# Patient Record
Sex: MALE | Race: WHITE | NOT HISPANIC OR LATINO | Employment: FULL TIME | URBAN - METROPOLITAN AREA
[De-identification: names, ages, dates, MRNs, and addresses within clinical notes are randomized per-mention and may not be internally consistent; named-entity substitution may affect disease eponyms.]

---

## 2017-01-04 ENCOUNTER — OFFICE VISIT (OUTPATIENT)
Dept: OTOLARYNGOLOGY | Facility: CLINIC | Age: 22
End: 2017-01-04

## 2017-01-04 VITALS — HEIGHT: 76 IN | BODY MASS INDEX: 25.57 KG/M2 | WEIGHT: 210 LBS

## 2017-01-04 DIAGNOSIS — J32.4 CHRONIC PANSINUSITIS: Primary | ICD-10-CM

## 2017-01-04 ASSESSMENT — PAIN SCALES - GENERAL: PAINLEVEL: NO PAIN (0)

## 2017-01-04 NOTE — NURSING NOTE
Chief Complaint   Patient presents with     RECHECK     Return Rhinology - Chronic pansinusitis      Pt states no pain today.    N Ba KIMBROUGH

## 2017-01-04 NOTE — PATIENT INSTRUCTIONS
Plan of care:  You are scheduled for a polypectomy on 1/18 at 1130  Clinic contact information:  1. To schedule an appointment call [phone], option 1  2. To talk to the Triage RN call [phone], option 3  3. If you need to speak to Mignon or get a message to your doctor on a Friday, call the triage RN  4. MignonRN: [phone]  5. Surgery scheduling:      Jessika Khan: [phone]      Janie Rodriguez: [phone]  6. Fax: [phone]

## 2017-01-04 NOTE — Clinical Note
1/4/2017       RE: Manuel Gasca  [address] [facility] [address]     Dear Colleague,    Thank you for referring your patient, Manuel Gasca, to the [facility] EAR NOSE AND THROAT at [facility]. Please see a copy of my visit note below.    HISTORY OF PRESENT ILLNESS:  Manuel Gasca returns.  He has chronic rhinosinusitis related to cystic fibrosis.  He has had multiple procedures and has recurrent obstruction of his right frontal sinus.  He called in recently and we put him on some prednisone and Levaquin.  He responded nicely to that.  He comes in today for a routine check.  He also thinks that the irrigations he is doing with gentamicin are helping clear out some of the crusts in his nose.      PHYSICAL EXAMINATION:  He is examined today.      PROCEDURE:  Topical anesthetic decongestant solution is applied, and nasal endoscopy is performed.  On the left, he has some polypoid degeneration in the anterior aspect of the middle meatus.  Minimal crusting.  On the right, he also has minimal crusting but he has a large polyp in the anterior aspect of the middle meatus similar to what I have seen in the past on him.  He has a recurrent polyp in this area.      ASSESSMENT AND PLAN:  I recommended he come in for a polyp shaving procedure here in the clinic sometime in the near future.  Right now, he is not having any evidence of complete obstruction.  His eye is in a normal position, and he is not having pain so I will not treat him right now with prednisone or Levaquin.  We will bring him back in for the polyp shaving procedure sometime in the near future.     Again, thank you for allowing me to participate in the care of your patient.      Sincerely,    Fay Young MD

## 2017-01-18 ENCOUNTER — OFFICE VISIT (OUTPATIENT)
Dept: OTOLARYNGOLOGY | Facility: CLINIC | Age: 22
End: 2017-01-18

## 2017-01-18 VITALS — HEIGHT: 77 IN | BODY MASS INDEX: 24.56 KG/M2 | WEIGHT: 208 LBS

## 2017-01-18 DIAGNOSIS — J32.4 CHRONIC PANSINUSITIS: Primary | ICD-10-CM

## 2017-01-18 RX ORDER — PREDNISONE 20 MG/1
TABLET ORAL
Qty: 6 TABLET | Refills: 0 | Status: SHIPPED | OUTPATIENT
Start: 2017-01-18 | End: 2017-08-11

## 2017-01-18 RX ORDER — LEVOFLOXACIN 500 MG/1
500 TABLET, FILM COATED ORAL DAILY
Qty: 14 TABLET | Refills: 0 | Status: SHIPPED | OUTPATIENT
Start: 2017-01-18 | End: 2017-02-01

## 2017-01-18 ASSESSMENT — PAIN SCALES - GENERAL: PAINLEVEL: NO PAIN (0)

## 2017-01-18 NOTE — PATIENT INSTRUCTIONS
Plan of care:  Today you had a polypectomy on the right side of your nose, frontal sinus  You might experience some bloody drainage for 24 hours  Levaquin 500mg/day for 14 days  Prednisone 40mg/day for 3 day  Clinic contact information:  1. To schedule an appointment call [phone], option 1  2. To talk to the Triage RN call [phone], option 3  3. If you need to speak to Mignon or get a message to your doctor on a Friday, call the triage RN  4. MignonRN: [phone]  5. Surgery scheduling:      Jessika Khan: [phone]      Janie Rodriguez: [phone]  6. Fax: [phone]

## 2017-01-18 NOTE — NURSING NOTE
"[facility] EAR NOSE AND THROAT  [address] [address]  Dept: [phone]  ______________________________________________________________________________    Patient: Manuel Gasca   : 1995   MRN: [id number]   2017    INVASIVE PROCEDURE SAFETY CHECKLIST    Date: 2017   Procedure:Polypectomy  Patient Name: Manuel Gasca  MRN: [id number]  YOB: 1995    Action: Complete sections as appropriate. Any discrepancy results in a HARD COPY until resolved.     PRE PROCEDURE:  Patient ID verified with 2 identifiers (name and  or MRN): Yes  Procedure and site verified with patient/designee (when able): Yes  Accurate consent documentation in medical record: Yes  H&P (or appropriate assessment) documented in medical record: NA  H&P must be up to 20 days prior to procedure and updates within 24 hours of procedure as applicable: {NA YES NO, EXPLAIN:692848::\"no  Relevant diagnostic and radiology test results appropriately labeled and displayed as applicable: no  Procedure site(s) marked with provider initials: {NA YES NO, EXPLAIN:693909:no    TIMEOUT:  Time-Out performed immediately prior to starting procedure, including verbal and active participation of all team members addressing the following:Yes  * Correct patient identify  * Confirmed that the correct side and site are marked  * An accurate procedure consent form  * Agreement on the procedure to be done  * Correct patient position  * Relevant images and results are properly labeled and appropriately displayed  * The need to administer antibiotics or fluids for irrigation purposes during the procedure as applicable   * Safety precautions based on patient history or medication use    DURING PROCEDURE: Verification of correct person, site, and procedures any time the responsibility for care of the patient is transferred to another member of the care team.               "

## 2017-01-18 NOTE — Clinical Note
1/18/2017       RE: Manuel Gasca  [address] [place] [id number]     Dear Colleague,    Thank you for referring your patient, Manuel Gasca, to the [facility] EAR NOSE AND THROAT at [facility]. Please see a copy of my visit note below.    HISTORY OF PRESENT ILLNESS:  Manuel Gasca presents for nasal polypectomy.  I saw him previously.  He has chronic sinusitis related to cystic fibrosis.  The last time I saw him, his frontal recess on the right was blocked with a large polyp.      PROCEDURE NOTE:  The risks and benefits were explained to the patient.  Signed consent was obtained.  He is in the procedure room.  Nose is anesthetized topically with lidocaine and phenylephrine solution.  A 0-degree rigid endoscope was used for visualization.  Injection with 1% lidocaine with epinephrine was performed into the polyp.  The polyp shaver is used to evacuate the polyp from the frontal recess.  I then passed a curved olive tip suction up into the frontal sinus and evacuated mucopurulent secretions.  He tolerates this well.  There still appears to be a moderate amount of inflamed granulation tissue around the opening.      ASSESSMENT AND PLAN:  I am going to treat him with Levaquin and prednisone and see him back in the next month or so.  I suspect the next time we address his issues, we will probably have to go to the operating room to clear out polyps and granulation tissue from the frontal recess on the right.  He seems comfortable with this plan.     Again, thank you for allowing me to participate in the care of your patient.      Sincerely,    Fay Young MD

## 2017-01-18 NOTE — PROGRESS NOTES
HISTORY OF PRESENT ILLNESS:  Manuel Gasca presents for nasal polypectomy.  I saw him previously.  He has chronic sinusitis related to cystic fibrosis.  The last time I saw him, his frontal recess on the right was blocked with a large polyp.      PROCEDURE NOTE:  The risks and benefits were explained to the patient.  Signed consent was obtained.  He is in the procedure room.  Nose is anesthetized topically with lidocaine and phenylephrine solution.  A 0-degree rigid endoscope was used for visualization.  Injection with 1% lidocaine with epinephrine was performed into the polyp.  The polyp shaver is used to evacuate the polyp from the frontal recess.  I then passed a curved olive tip suction up into the frontal sinus and evacuated mucopurulent secretions.  He tolerates this well.  There still appears to be a moderate amount of inflamed granulation tissue around the opening.      ASSESSMENT AND PLAN:  I am going to treat him with Levaquin and prednisone and see him back in the next month or so.  I suspect the next time we address his issues, we will probably have to go to the operating room to clear out polyps and granulation tissue from the frontal recess on the right.  He seems comfortable with this plan.

## 2017-03-06 ENCOUNTER — OFFICE VISIT (OUTPATIENT)
Dept: OTOLARYNGOLOGY | Facility: CLINIC | Age: 22
End: 2017-03-06

## 2017-03-06 DIAGNOSIS — J32.4 CHRONIC PANSINUSITIS: Primary | ICD-10-CM

## 2017-03-06 DIAGNOSIS — E84.0 CYSTIC FIBROSIS WITH PULMONARY MANIFESTATIONS (H): ICD-10-CM

## 2017-03-06 RX ORDER — LEVOFLOXACIN 500 MG/1
500 TABLET, FILM COATED ORAL DAILY
Qty: 14 TABLET | Refills: 0 | Status: SHIPPED | OUTPATIENT
Start: 2017-03-06 | End: 2017-03-20

## 2017-03-06 RX ORDER — AZITHROMYCIN 250 MG/1
TABLET, FILM COATED ORAL
Qty: 30 TABLET | Refills: 3 | Status: SHIPPED | OUTPATIENT
Start: 2017-03-06 | End: 2017-03-11

## 2017-03-06 RX ORDER — PREDNISONE 20 MG/1
TABLET ORAL
Qty: 15 TABLET | Refills: 1 | Status: SHIPPED | OUTPATIENT
Start: 2017-03-06 | End: 2017-08-11

## 2017-03-06 ASSESSMENT — PAIN SCALES - GENERAL: PAINLEVEL: MILD PAIN (3)

## 2017-03-06 NOTE — PROGRESS NOTES
HISTORY OF PRESENT ILLNESS:  The patient has chronic rhinosinusitis related to cystic fibrosis.  I have performed multiple surgeries on him.  He has fairly minimal pulmonary symptoms.  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  He had surgery last in October of last year.  He now comes in with a second bout of increased congestion and some head pressure and pain.  He has headaches consistently daily from 1 p.m. until 6 p.m.  He takes ibuprofen for this.  He has had increased coughing and congestion.  He has been doing gentamicin irrigations.  The last time I cultured his nasal secretions, he had Staph aureus and had become resistant to doxycycline and azithromycin.      PHYSICAL EXAMINATION:  He is examined today.  On anterior rhinoscopy, he has copious purulence.      PROCEDURE:  Topical anesthetic decongestant solution is applied, and nasal endoscopy is performed.  I suctioned secretions from the middle meatus and frontal recess bilaterally.  On the right, it is difficult to see.  He has large obstructing polyps and deviated septum.      ASSESSMENT AND PLAN:  He has fairly significant recurrence of his sinus disease.  He is traveling next week.  I would like to put him on prednisone again.  I gave him a prescription for Levaquin to take in the event that he develops increasing pain or pressure around his eye.  I am also going to restart him on azithromycin for the anti-inflammatory effect, not for the antibiotic effect.  He is comfortable with this plan.  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  I think at that time it would be good timing to go in and clean out the sinuses again.  I may need to extend the dissection to include partial resection of the septum in order to improve my ability to visualize his sinus on that side.  His mother was with him today, and we discussed this as well.

## 2017-03-06 NOTE — PATIENT INSTRUCTIONS
Plan of care:  Prednisone 40mg/day x 5 days then 20mg/day for 5 days  Levaquin 500mg/day x 14 days  Azithromycin 250mg/day for 30 days  Follow up to discuss surgery when finished with school  Clinic contact information:  1. To schedule an appointment call [phone], option 1  2. To talk to the Triage RN call [phone], option 3  3. If you need to speak to Mignon or get a message to your doctor on a Friday, call the triage RN  4. MignonRN: [phone]  5. Surgery scheduling:      Jessika Khan: [phone]      Janie Rodriguez: [phone]  6. Fax: [phone]  7. [phone]

## 2017-03-06 NOTE — MR AVS SNAPSHOT
After Visit Summary   3/6/2017    Manuel Gasca    MRN: [id number]           Patient Information     Date Of Birth          1995        Visit Information        Provider Department      3/6/2017 2:00 PM Fay Young MD [facility] Ear Nose and Throat        Today's Diagnoses     Chronic pansinusitis    -  1    Cystic fibrosis with pulmonary manifestations (H)          Care Instructions    Plan of care:  Prednisone 40mg/day x 5 days then 20mg/day for 5 days  Levaquin 500mg/day x 14 days  Azithromycin 250mg/day for 30 days  Follow up to discuss surgery when finished with school  Clinic contact information:  1. To schedule an appointment call [phone], option 1  2. To talk to the Triage RN call [phone], option 3  3. If you need to speak to Mignon or get a message to your doctor on a Friday, call the triage RN  4. MignonRN: [phone]  5. Surgery scheduling:      Jessika Khan: [phone]      Janie Rodriguez: [phone]  6. Fax: [phone]  7. [phone]          Follow-ups after your visit        Who to contact     Please call your clinic at [phone] to:    Ask questions about your health    Make or cancel appointments    Discuss your medicines    Learn about your test results    Speak to your doctor   If you have compliments or concerns about an experience at your clinic, or if you wish to file a complaint, please contact [facility] Patient Relations at [phone] or email us at [email or web address]         Additional Information About Your Visit        MyChart Information     [employer] is an electronic gateway that provides easy, online access to your medical records. With [employer], you can request a clinic appointment, read your test results, renew a prescription or communicate with your care team.     To sign up for [employer] visit the website at [email or web address]   You will be asked to enter the access code listed  below, as well as some personal information. Please follow the directions to create your username and password.     Your access code is: [email or web address]  Expires: 3/21/2017  6:30 AM     Your access code will  in 90 days. If you need help or a new code, please contact your [facility] or call [phone] for assistance.        Care EveryWhere ID     This is your Care EveryWhere ID. This could be used by other organizations to access your [facility] medical records  [id number]         Blood Pressure from Last 3 Encounters:   10/07/16 121/65   14 118/65   14 115/69    Weight from Last 3 Encounters:   17 94.3 kg (208 lb)   17 95.3 kg (210 lb)   10/31/16 96.4 kg (212 lb 8 oz)              We Performed the Following     NASAL ENDOSCOPY, DIAGNOSTIC          Today's Medication Changes          These changes are accurate as of: 3/6/17 11:59 PM.  If you have any questions, ask your nurse or doctor.               Start taking these medicines.        Dose/Directions    azithromycin 250 MG tablet   Commonly known as:  ZITHROMAX   Started by:  Fay Young MD        daily   Quantity:  30 tablet   Refills:  3         These medicines have changed or have updated prescriptions.        Dose/Directions    * levofloxacin 500 MG tablet   Commonly known as:  LEVAQUIN   This may have changed:  Another medication with the same name was added. Make sure you understand how and when to take each.   Used for:  Chronic pansinusitis   Changed by:  Fay Young MD        Dose:  500 mg   Take 1 tablet (500 mg) by mouth daily   Quantity:  14 tablet   Refills:  0       * levofloxacin 500 MG tablet   Commonly known as:  LEVAQUIN   This may have changed:  You were already taking a medication with the same name, and this prescription was added. Make sure you understand how and when to take each.   Changed by:  Fay Young MD        Dose:  500 mg   Take 1 tablet (500 mg) by mouth daily  for 14 days   Quantity:  14 tablet   Refills:  0       * predniSONE 20 MG tablet   Commonly known as:  DELTASONE   This may have changed:  Another medication with the same name was added. Make sure you understand how and when to take each.   Used for:  Nasal polyp   Changed by:  Fay Young MD        BID   Quantity:  15 tablet   Refills:  0       * predniSONE 20 MG tablet   Commonly known as:  DELTASONE   This may have changed:  Another medication with the same name was added. Make sure you understand how and when to take each.   Used for:  Chronic pansinusitis   Changed by:  Fay Young MD        40mg/day x 3 days   Quantity:  6 tablet   Refills:  0       * predniSONE 20 MG tablet   Commonly known as:  DELTASONE   This may have changed:  You were already taking a medication with the same name, and this prescription was added. Make sure you understand how and when to take each.   Changed by:  Fay Young MD        40mg x 5 days then 20mg x 5 days   Quantity:  15 tablet   Refills:  1       * Notice:  This list has 5 medication(s) that are the same as other medications prescribed for you. Read the directions carefully, and ask your doctor or other care provider to review them with you.         Where to get your medicines      These medications were sent to [address] [address]     Phone:  [phone]     azithromycin 250 MG tablet    levofloxacin 500 MG tablet    predniSONE 20 MG tablet                Primary Care Provider Office Phone # Fax #    Delbert FOREIGN Jennings [phone]       [address] [address]        Thank you!     Thank you for choosing [facility] EAR NOSE AND THROAT  for your care. Our goal is always to provide you with excellent care. Hearing back from our patients is one way we can continue to improve our services. Please take a few minutes to complete the written survey  that you may receive in the mail after your visit with us. Thank you!             Your Updated Medication List - Protect others around you: Learn how to safely use, store and throw away your medicines at www.disposemymeds.org.          This list is accurate as of: 3/6/17 11:59 PM.  Always use your most recent med list.                   Brand Name Dispense Instructions for use    azithromycin 250 MG tablet    ZITHROMAX    30 tablet    daily       fluticasone 50 MCG/ACT spray    FLONASE    1 Bottle    Spray 2 sprays into both nostrils daily       gentamicin 0.008% in 1000ml 0.9% NaCl Soln nasal irrigation    GARAMYCIN    1000 mL    Irrigate 30 mL between each nostril twice daily using sinus rinse bottle PLEASE MAIL TO PATIENT       * levofloxacin 500 MG tablet    LEVAQUIN    14 tablet    Take 1 tablet (500 mg) by mouth daily       * levofloxacin 500 MG tablet    LEVAQUIN    14 tablet    Take 1 tablet (500 mg) by mouth daily for 14 days       methylPREDNISolone 4 MG tablet    MEDROL DOSEPAK    21 tablet    Take as directed per patient instruction card       * predniSONE 20 MG tablet    DELTASONE    15 tablet    BID       * predniSONE 20 MG tablet    DELTASONE    6 tablet    40mg/day x 3 days       * predniSONE 20 MG tablet    DELTASONE    15 tablet    40mg x 5 days then 20mg x 5 days       sodium chloride 0.65 % nasal spray    OCEAN    1 Bottle    Spray 1-2 sprays in nostril every hour as needed for congestion (nasal dryness) Use in EACH nostril.       * Notice:  This list has 5 medication(s) that are the same as other medications prescribed for you. Read the directions carefully, and ask your doctor or other care provider to review them with you.

## 2017-03-06 NOTE — LETTER
3/6/2017       RE: Manuel Gasca  [address] [facility] [address]     Dear Colleague,    Thank you for referring your patient, Manuel Gasca, to the [facility] EAR NOSE AND THROAT at [facility]. Please see a copy of my visit note below.    HISTORY OF PRESENT ILLNESS:  The patient has chronic rhinosinusitis related to cystic fibrosis.  I have performed multiple surgeries on him.  He has fairly minimal pulmonary symptoms.  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  He had surgery last in October of last year.  He now comes in with a second bout of increased congestion and some head pressure and pain.  He has headaches consistently daily from 1 p.m. until 6 p.m.  He takes ibuprofen for this.  He has had increased coughing and congestion.  He has been doing gentamicin irrigations.  The last time I cultured his nasal secretions, he had Staph aureus and had become resistant to doxycycline and azithromycin.      PHYSICAL EXAMINATION:  He is examined today.  On anterior rhinoscopy, he has copious purulence.      PROCEDURE:  Topical anesthetic decongestant solution is applied, and nasal endoscopy is performed.  I suctioned secretions from the middle meatus and frontal recess bilaterally.  On the right, it is difficult to see.  He has large obstructing polyps and deviated septum.      ASSESSMENT AND PLAN:  He has fairly significant recurrence of his sinus disease.  He is traveling next week.  I would like to put him on prednisone again.  I gave him a prescription for Levaquin to take in the event that he develops increasing pain or pressure around his eye.  I am also going to restart him on azithromycin for the anti-inflammatory effect, not for the antibiotic effect.  He is comfortable with this plan.  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  I think at that time it would be good timing to go in and clean out  the sinuses again.  I may need to extend the dissection to include partial resection of the septum in order to improve my ability to visualize his sinus on that side.  His mother was with him today, and we discussed this as well.         Again, thank you for allowing me to participate in the care of your patient.      Sincerely,    Fay Young MD

## 2017-07-19 ENCOUNTER — OFFICE VISIT (OUTPATIENT)
Dept: OTOLARYNGOLOGY | Facility: CLINIC | Age: 22
End: 2017-07-19

## 2017-07-19 VITALS — HEIGHT: 77 IN | WEIGHT: 210 LBS | BODY MASS INDEX: 24.79 KG/M2

## 2017-07-19 DIAGNOSIS — J32.4 CHRONIC PANSINUSITIS: Primary | ICD-10-CM

## 2017-07-19 DIAGNOSIS — E84.0 CYSTIC FIBROSIS WITH PULMONARY MANIFESTATIONS (H): ICD-10-CM

## 2017-07-19 ASSESSMENT — PAIN SCALES - GENERAL: PAINLEVEL: NO PAIN (0)

## 2017-07-19 NOTE — PATIENT INSTRUCTIONS
Plan of care:  Surgery date is   PAC pre-op is   Post op visit with Dr Young is on   Pre-op reminders:  1. Complete pre-op H+P within 30 days of surgery (recommend pre-op appointment 2 weeks prior to surgery date).   2.  needed on day of surgery.   3.Discuss all medications, including blood-thinning medications with PCP at pre-op appointment (Coumadin, Plavix, Aspirin, Ibuprofen/Motrin, Vitamin E and Fish Oil), which may need to be discontinued 7 days prior to surgery date.   4. Nothing to eat or drink after midnight the night before surgery.   5. Take shower or bath the morning of surgery and remove deodorant, cologne, scented lotion, makeup, nail polish and jewelry.     Clinic contact information:  1. To schedule an appointment call [phone], option 1  2. To talk to the Triage RN call [phone], option 3  3. If you need to speak to Mignon or get a message to your doctor on a Friday, call the triage RN  4. MignonRN: [phone]  5. Surgery scheduling:      Jessika Khan: [phone]      Janie Rodriguez: [phone]  6. Fax: [phone]  7. [phone]

## 2017-07-19 NOTE — PROGRESS NOTES
Manuel returns for a sinus check he has chronic sinusitis related to cystic fibrosis. He states he's been doing overall quite well. He has no headaches. He only notices that sometimes his eye on the right is a little bit lower than on the left. The only treatment he is using right now is aggressive saline rinsing.    Examination: nasal endoscopy is performed. The right nasal passage reveals large polyps in the anterior aspect of the middle meatus. There is also a large polyp in the middle of the nasal passage at the level of the maxillary antrum. On the left he has 2 large polyps in the anterior aspect of the middle meatus.  Purulent secretions throughout    Assessment and plan: Manuel is doing fairly well symptomatically however his exam reveals that he has moderate recurrence of disease. I would recommend taking him back to the operating room for repeat polypectomy and nasal sinus cleanout. And he is in agreement with this plan. He'll continue irrigations for now. Sometime in the next several months we'll plan to take him to the operating room with a new Stealth CT for image guidance and history and physical in the anesthesia clinic. I suspect he will be able to be have surgery in the outpatient ambulatory setting.  Has resistant staph aureus.

## 2017-07-19 NOTE — NURSING NOTE
Relevant Diagnosis: Sinusitis/CF  Teaching Topic: Surgery: Endoscopic sinus surgery  Person(s) involved in teaching:  Patient     Teaching Concerns Addressed:  Pre op teaching included the need for an H&P, NPO status pre op, hospital routines, expected recovery, activity  restrictions, antimicrobial scrub, s/s of infection, pain control methods and the importance of follow up appointments.  The patient voiced an understanding of all instructions and will call with questions.     Motivation Level:  Asks Questions:   Yes  Eager to Learn:   Yes  Cooperative:   Yes  Receptive (willing/able to accept information):   Yes     Patient  demonstrates understanding of the following:  Reason for the appointment, diagnosis and treatment plan:   Yes  Knowledge of proper use of medications and conditions for which they are ordered (with special attention to potential side effects or drug interactions):   Yes  Which situations necessitate calling provider and whom to contact:   Yes        Proper use and care of  (medical equip, care aids, etc.):   NA  Nutritional needs and diet plan:   Yes  Pain management techniques:   Yes  Patient instructed on hand hygiene:  Yes  How and/when to access community resources:   NA     Infection Prevention:  Patient   demonstrates understanding of the following:  Surgical procedure site care taught   Signs and symptoms of infection taught Yes       Instructional Materials Used/Given: Pre op booklet.

## 2017-07-19 NOTE — NURSING NOTE
Chief Complaint   Patient presents with     RECHECK     sinus     Lesly Salgado Medical Assistant

## 2017-07-19 NOTE — LETTER
7/19/2017       RE: Manuel Gasca  [address] [place] [id number]     Dear Colleague,    Thank you for referring your patient, Manuel Gasca, to the [facility] EAR NOSE AND THROAT at [facility]. Please see a copy of my visit note below.    Manuel returns for a sinus check he has chronic sinusitis related to cystic fibrosis. He states he's been doing overall quite well. He has no headaches. He only notices that sometimes his eye on the right is a little bit lower than on the left. The only treatment he is using right now is aggressive saline rinsing.    Examination: nasal endoscopy is performed. The right nasal passage reveals large polyps in the anterior aspect of the middle meatus. There is also a large polyp in the middle of the nasal passage at the level of the maxillary antrum. On the left he has 2 large polyps in the anterior aspect of the middle meatus.  Purulent secretions throughout    Assessment and plan: Manuel is doing fairly well symptomatically however his exam reveals that he has moderate recurrence of disease. I would recommend taking him back to the operating room for repeat polypectomy and nasal sinus cleanout. And he is in agreement with this plan. He'll continue irrigations for now. Sometime in the next several months we'll plan to take him to the operating room with a new Stealth CT for image guidance and history and physical in the anesthesia clinic. I suspect he will be able to be have surgery in the outpatient ambulatory setting.  Has resistant staph aureus.    Again, thank you for allowing me to participate in the care of your patient.      Sincerely,    Fay Young MD

## 2017-07-19 NOTE — MR AVS SNAPSHOT
"              After Visit Summary   2017    Manuel Gasca    MRN: [id number]           Patient Information     Date Of Birth          1995        Visit Information        Provider Department      2017 8:00 AM Fay Young MD [facility] Ear Nose and Throat        Today's Diagnoses     Chronic pansinusitis    -  1    Cystic fibrosis with pulmonary manifestations (H)           Follow-ups after your visit        Who to contact     Please call your clinic at [phone] to:    Ask questions about your health    Make or cancel appointments    Discuss your medicines    Learn about your test results    Speak to your doctor   If you have compliments or concerns about an experience at your clinic, or if you wish to file a complaint, please contact [facility] Patient Relations at [phone] or email us at [email or web address]         Additional Information About Your Visit        MyChart Information     [employer] is an electronic gateway that provides easy, online access to your medical records. With [employer], you can request a clinic appointment, read your test results, renew a prescription or communicate with your care team.     To sign up for [employer] visit the website at [email or web address]   You will be asked to enter the access code listed below, as well as some personal information. Please follow the directions to create your username and password.     Your access code is: [email or web address]  Expires: 10/3/2017  6:30 AM     Your access code will  in 90 days. If you need help or a new code, please contact your [facility] or call [phone] for assistance.        Care EveryWhere ID     This is your Care EveryWhere ID. This could be used by other organizations to access your [place] medical records  [id number]        Your Vitals Were     Height BMI (Body Mass Index)                1.95 m (6' 4.77\") 25.05 kg/m2           " Blood Pressure from Last 3 Encounters:   10/07/16 121/65   08/26/14 118/65   03/27/14 115/69    Weight from Last 3 Encounters:   07/19/17 95.3 kg (210 lb)   01/18/17 94.3 kg (208 lb)   01/04/17 95.3 kg (210 lb)              We Performed the Following     NASAL ENDOSCOPY, DIAGNOSTIC        Primary Care Provider Office Phone # Fax #    Delbert Jennings [phone]       [facility] [address]  [facility] [address]        Equal Access to Services     [facility]: Hadii aad ku hadasho Soomaali, waaxda luqadaha, qaybta kaalmada adebrannonyapedro, lenora bella . So [facility] [phone].    ATENCIÓN: Si habla español, tiene a toledo disposición servicios gratuitos de asistencia lingüística. [facility] [phone].    We comply with applicable federal civil rights laws and [place] laws. We do not discriminate on the basis of race, color, national origin, age, disability sex, sexual orientation or gender identity.            Thank you!     Thank you for choosing [facility] EAR NOSE AND THROAT  for your care. Our goal is always to provide you with excellent care. Hearing back from our patients is one way we can continue to improve our services. Please take a few minutes to complete the written survey that you may receive in the mail after your visit with us. Thank you!             Your Updated Medication List - Protect others around you: Learn how to safely use, store and throw away your medicines at www.disposemymeds.org.          This list is accurate as of: 7/19/17  8:39 AM.  Always use your most recent med list.                   Brand Name Dispense Instructions for use Diagnosis    fluticasone 50 MCG/ACT spray    FLONASE    1 Bottle    Spray 2 sprays into both nostrils daily    Cystic fibrosis with pulmonary manifestations (H)       gentamicin 0.008% in 1000ml 0.9% NaCl Soln nasal irrigation    GARAMYCIN    1000 mL    Irrigate 30 mL between each nostril twice daily  using sinus rinse bottle PLEASE MAIL TO PATIENT    Chronic pansinusitis       levofloxacin 500 MG tablet    LEVAQUIN    14 tablet    Take 1 tablet (500 mg) by mouth daily    Chronic pansinusitis       methylPREDNISolone 4 MG tablet    MEDROL DOSEPAK    21 tablet    Take as directed per patient instruction card    Cystic fibrosis with pulmonary manifestations (H), Chronic frontal sinusitis       * predniSONE 20 MG tablet    DELTASONE    15 tablet    BID    Nasal polyp       * predniSONE 20 MG tablet    DELTASONE    6 tablet    40mg/day x 3 days    Chronic pansinusitis       * predniSONE 20 MG tablet    DELTASONE    15 tablet    40mg x 5 days then 20mg x 5 days        sodium chloride 0.65 % nasal spray    OCEAN    1 Bottle    Spray 1-2 sprays in nostril every hour as needed for congestion (nasal dryness) Use in EACH nostril.    Chronic pansinusitis       * Notice:  This list has 3 medication(s) that are the same as other medications prescribed for you. Read the directions carefully, and ask your doctor or other care provider to review them with you.

## 2017-07-27 ENCOUNTER — ANESTHESIA EVENT (OUTPATIENT)
Dept: SURGERY | Facility: AMBULATORY SURGERY CENTER | Age: 22
End: 2017-07-27

## 2017-07-27 ENCOUNTER — OFFICE VISIT (OUTPATIENT)
Dept: SURGERY | Facility: CLINIC | Age: 22
End: 2017-07-27

## 2017-07-27 VITALS
SYSTOLIC BLOOD PRESSURE: 124 MMHG | OXYGEN SATURATION: 97 % | TEMPERATURE: 97.6 F | WEIGHT: 209.1 LBS | HEART RATE: 70 BPM | DIASTOLIC BLOOD PRESSURE: 76 MMHG | HEIGHT: 77 IN | RESPIRATION RATE: 14 BRPM | BODY MASS INDEX: 24.69 KG/M2

## 2017-07-27 DIAGNOSIS — Z01.818 PRE-OP EXAMINATION: Primary | ICD-10-CM

## 2017-07-27 NOTE — H&P
Pre-Operative H & P     CC:  Preoperative exam to assess for increased cardiopulmonary risk while undergoing surgery and anesthesia.    Date of Encounter: 7/27/2017  Primary Care Physician:  Delbert Jennings  Manuel Gasca is a 22 year old male who presents for pre-operative H & P in preparation for  Bilateral Total Ethmoidectomy, Sphenoidotomy, Maxillary Antrostomy and Frontal Sinusotomy and Propel Stent Placement on the Right, Stealth Guided on 8/11/2017 with Dr. Fay Young at [facility] under general anesthesia.  Mr. Gasca has cystic fibrosis with mainly sinus manifestations with pulmonary records indicating no appreciable lung disease or pancreatic insufficiency.  He is an established patient of Dr. Young's with last visit on 7/19/2017.  At that visit, nasal endoscopy revealed large polyps in the anterior aspect of the middle meatus in the right nasal passage. Left nasal passage revealed 2 large polyps in the anterior aspect of the middle meatus.   He has had multiple nasal procedures with last endoscopic sinus surgery 1/31/2014. Dr. Young recommended the above procedure.    Patient presents to PAC clinic and reports besides his sinus issues, he feels in good health.  He denies any cardiopulmonary history or symptoms.  He denies any recent fever, chills, or sore throat.  He has a mild cough which he attributes to post nasal drip. He has not used antibiotics or prednisone since March when he had a sinus exacerbation.  PAC referral for risk assessment and optimization of anesthesia with comorbid conditions of cystic fibrosis with sinus manifestations; nasal polyps; and history of concussion.     History is obtained from the patient and electronic health record.     Past Medical History  Past Medical History:   Diagnosis Date     Concussion July 2011     Cystic fibrosis with pulmonary manifestations (H) 12/29/2011    Genotype: [id number].[id number] (aka c.1055G>A) 7T/9T     Nasal  polyps        Past Surgical History  Past Surgical History:   Procedure Laterality Date     EYE SURGERY       HC EXCISION NASAL POLYP(S), EXTENSIVE  2010    [facility]     NASAL/SINUS POLYPECTOMY       OPTICAL TRACKING SYSTEM ENDOSCOPIC SINUS SURGERY  2/17/2012    Procedure:OPTICAL TRACKING SYSTEM ENDOSCOPIC SINUS SURGERY; Stealth Assised Bilateral Polypectomy, Frontal Sinusotomy, Ethmoidectomy; Surgeon:KHALIF YOUNG; Location:[facility] OR     OPTICAL TRACKING SYSTEM ENDOSCOPIC SINUS SURGERY  7/8/2013    Procedure: OPTICAL TRACKING SYSTEM ENDOSCOPIC SINUS SURGERY;  Stealth Assisted Polypectomy With Shaver, Ethmoidectomy, Frontal Sinusotomy, Maxillary Antrostomy;  Surgeon: Khalif Young MD;  Location: [facility] OR     OPTICAL TRACKING SYSTEM ENDOSCOPIC SINUS SURGERY  11/27/2013    Procedure: OPTICAL TRACKING SYSTEM ENDOSCOPIC SINUS SURGERY;  Stealth Assisted Frontal Sinus Mucocelel Repair ;  Surgeon: Khalif Young MD;  Location: [facility] OR     OPTICAL TRACKING SYSTEM ENDOSCOPIC SINUS SURGERY  1/31/2014    Procedure: OPTICAL TRACKING SYSTEM ENDOSCOPIC SINUS SURGERY;  Bilateral Frontal Sinusotomy, Ethmoidectomy, Maxillary antrostomy, under image guidance. ;  Surgeon: Khalif Young MD;  Location: [facility] OR     OPTICAL TRACKING SYSTEM ENDOSCOPIC SINUS SURGERY Bilateral 10/7/2016    Procedure: OPTICAL TRACKING SYSTEM ENDOSCOPIC SINUS SURGERY;  Surgeon: Khalif Young MD;  Location: [facility] OR     TEAR DUCT SURGERY  infancy       Hx of Blood transfusions/reactions: denies     Hx of abnormal bleeding or anti-platelet use: denies    Menstrual history: No LMP for male patient.na    Steroid use in the last year: March 2017    Personal or FH with difficulty with Anesthesia:  denies    Prior to Admission Medications  Current Outpatient Prescriptions   Medication Sig Dispense Refill     predniSONE (DELTASONE) 20 MG tablet 40mg x 5 days then 20mg x 5 days 15 tablet 1     predniSONE (DELTASONE) 20 MG tablet 40mg/day x 3 days 6 tablet 0      levofloxacin (LEVAQUIN) 500 MG tablet Take 1 tablet (500 mg) by mouth daily 14 tablet 0     sodium chloride (OCEAN) 0.65 % nasal spray Spray 1-2 sprays in nostril every hour as needed for congestion (nasal dryness) Use in EACH nostril. 1 Bottle 1     fluticasone (FLONASE) 50 MCG/ACT nasal spray Spray 2 sprays into both nostrils daily 1 Bottle 11     methylPREDNISolone (MEDROL DOSEPAK) 4 MG tablet Take as directed per patient instruction card 21 tablet 1     predniSONE (DELTASONE) 20 MG tablet BID 15 tablet 0       Allergies  Allergies   Allergen Reactions     Nkda [No Known Drug Allergies]        Social History  Social History     Social History     Marital status: Single     Spouse name: N/A     Number of children: N/A     Years of education: N/A     Occupational History     [employer]      Social History Main Topics     Smoking status: Never Smoker     Smokeless tobacco: Never Used     Alcohol use Yes      Comment: Social     Drug use: No     Sexual activity: No     Other Topics Concern     Not on file     Social History Narrative       Family History  Family History   Problem Relation Age of Onset     Hypertension Maternal Grandmother      Other - See Comments Other      Lymphoma maternal Grt grandmother     CANCER Maternal Grandfather      Prostate     CANCER Paternal Grandmother      Pancreatic CA       ROS/MED HX    ENT/Pulmonary:     (+), cystic fibrosis . .    Neurologic:  - neg neurologic ROS     Cardiovascular:  - neg cardiovascular ROS       METS/Exercise Tolerance:  >4 METS   Hematologic:  - neg hematologic  ROS       Musculoskeletal:  - neg musculoskeletal ROS       GI/Hepatic:  - neg GI/hepatic ROS       Renal/Genitourinary:  - ROS Renal section negative       Endo:  - neg endo ROS       Psychiatric:  - neg psychiatric ROS       Infectious Disease:  - neg infectious disease ROS       Malignancy:      - no malignancy   Other:    - neg other ROS       Temp: 97.6  F (36.4  C) Temp src: Oral BP: 124/76  "Pulse: 70   Resp: 14 SpO2: 97 %         209 lbs 1.6 oz  6' 5\"   Body mass index is 24.8 kg/(m^2).       Physical Exam  Constitutional: Awake, alert, cooperative, no apparent distress, and appears stated age.  Eyes: Pupils equal, round and reactive to light, extra ocular muscles intact, sclera clear, conjunctiva normal.  HENT: Normocephalic, oral pharynx with moist mucus membranes, good dentition. No goiter appreciated.   Respiratory: Clear to auscultation bilaterally, no crackles or wheezing.  Cardiovascular: Regular rate and rhythm, normal S1 and S2, and no murmur noted.  Carotids +2, no bruits. No edema. Palpable pulses to radial  DP and PT arteries.   GI: Normal bowel sounds, soft, non-distended, non-tender, no masses palpated, no hepatosplenomegaly.    Lymph/Hematologic: No cervical lymphadenopathy and no supraclavicular lymphadenopathy.  Genitourinary:  na  Skin: Warm and dry.  No rashes at anticipated surgical site.   Musculoskeletal: Full ROM of neck. There is no redness, warmth, or swelling of the joints. Gross motor strength is normal.    Neurologic: Awake, alert, oriented to name, place and time. Cranial nerves II-XII are grossly intact. Gait is normal.   Neuropsychiatric: Calm, cooperative. Normal affect.         Procedures   PFT 8/26/2017   PRE-BRONCH               POST-BRONCH                            Ernestina     Prd     %Prd    Ernestina    %Prd     %Chg     SPIROMETRY   FVC (L)                  7.08    6.76      105   FEV1 (L)                 5.57    5.55      100   FEV1/FVC (%)               79      84       94   FEV1/FEV6 (%)              79      85       93   FEF 25-75%               5.02    5.60       90   FEF Max (L/sec)         11.20   11.62       96   FIVC (L)                 6.88   FIF Max (L/sec)          8.83    4.85      182    ASSESSMENT and PLAN  Manuel Gasca is a 22 year old male scheduled to undergo Bilateral Total Ethmoidectomy, Sphenoidotomy, Maxillary Antrostomy and Frontal " "Sinusotomy and Propel Stent Placement on the Right, Stealth Guided on 8/11/2017 with Dr. Fay [facility] at [facility] under general anesthesia.       He has the following specific operative considerations:     Cardiology - RCRI : No serious cardiac risks.  0.4 % risk of major adverse cardiac event. METS >4.  Excellent functional capacity>>>\"water skiing this weekend\"  Pulmonary - no smoking       - Cystic fibrosis with sinus manifestations.  Last seen by Dr. Jiménez in 2014 and according to his note, Mr. Gasca has no appreciable lung disease or pancreatic insufficiency.  Last use of prednisone and antibiotics was March 2017       - GIOVANA # of risks 1/8 = low risk  Hematology - VTE risk:  0.5%  GI - Risk of PONV score = 1.  If > 2, anti-emetic intervention recommended.   HEENT - Pansinusitis with nasal polyps 2/2 CF, procedure planned as above.    - Anesthesia considerations:  Refer to PAC assessment in anesthesia records.  Patient has had multiple sinus surgeries in the past without difficulty.    Arrival time, NPO, shower and medication instructions provided by nursing staff today.  Preparing For Your Surgery handout given.  Patient was discussed with Dr Norton. I spent 20 minutes face to face with patient, assessing, examining, and educating.        FOZIA Conway CNP  Preoperative Assessment Center  [facility]  Clinic and Surgery Center  Phone: [phone]  Fax: [phone]  "

## 2017-07-27 NOTE — ANESTHESIA PREPROCEDURE EVALUATION
Anesthesia Evaluation     . Pt has had prior anesthetic. Type: General    No history of anesthetic complications          ROS/MED HX    ENT/Pulmonary:     (+), cystic fibrosis . .    Neurologic:  - neg neurologic ROS     Cardiovascular:  - neg cardiovascular ROS       METS/Exercise Tolerance:  >4 METS   Hematologic:  - neg hematologic  ROS       Musculoskeletal:  - neg musculoskeletal ROS       GI/Hepatic:  - neg GI/hepatic ROS       Renal/Genitourinary:  - ROS Renal section negative       Endo:  - neg endo ROS       Psychiatric:  - neg psychiatric ROS       Infectious Disease:  - neg infectious disease ROS       Malignancy:      - no malignancy   Other:    - neg other ROS                 Physical Exam  Normal systems: dental    Airway   Mallampati: I  TM distance: >3 FB  Neck ROM: full    Dental     Cardiovascular   Rhythm and rate: regular and normal      Pulmonary    breath sounds clear to auscultation    Other findings: Procedures   PFT 8/26/2017   PRE-BRONCH               POST-BRONCH                            Ernestina     Prd     %Prd    Ernestina    %Prd     %Chg     SPIROMETRY   FVC (L)                  7.08    6.76      105   FEV1 (L)                 5.57    5.55      100   FEV1/FVC (%)               79      84       94   FEV1/FEV6 (%)              79      85       93   FEF 25-75%               5.02    5.60       90   FEF Max (L/sec)         11.20   11.62       96   FIVC (L)                 6.88   FIF Max (L/sec)          8.83    4.85      182           PAC Discussion and Assessment    ASA Classification: 3  Case is suitable for: ASC  Anesthetic techniques and relevant risks discussed: GA  Invasive monitoring and risk discussed:   Types:   Possibility and Risk of blood transfusion discussed:   NPO instructions given:   Additional anesthetic preparation and risks discussed:   Needs early admission to pre-op area:   Other:     PAC Resident/NP Anesthesia Assessment:  Manuel Gasca is a 22 year old male scheduled  "for Bilateral Total Ethmoidectomy, Sphenoidotomy, Maxillary Antrostomy and Frontal Sinusotomy and Propel Stent Placement on the Right, Stealth Guided on 8/11/2017 with Dr. Fay Young at [facility] under general anesthesia.  Mr. Gasca has cystic fibrosis with mainly sinus manifestations with pulmonary records indicating no appreciable lung disease or pancreatic insufficiency.  He is an established patient of Dr. Young's with last visit on 7/19/2017.  At that visit, nasal endoscopy revealed large polyps in the anterior aspect of the middle meatus in the right nasal passage. Left nasal passage revealed 2 large polyps in the anterior aspect of the middle meatus.   He has had multiple nasal procedures with last endoscopic sinus surgery 1/31/2014. Dr. Young recommended the above procedure.    Patient presents to PAC clinic and reports besides his sinus issues, he feels in good health.  He denies any cardiopulmonary history or symptoms.  He denies any recent fever, chills, or sore throat.  He has a mild cough which he attributes to post nasal drip. He has not used antibiotics or prednisone since March when he had a sinus exacerbation.  PAC referral for risk assessment and optimization of anesthesia with comorbid conditions of cystic fibrosis with sinus manifestations; nasal polyps; and history of concussion.    He has the following specific operative considerations:     Cardiology - RCRI : No serious cardiac risks.  0.4 % risk of major adverse cardiac event. METS >4.  Excellent functional capacity>>>\"water skiing this weekend\"  Pulmonary - no smoking       - Cystic fibrosis with sinus manifestations.  Last seen by Dr. Jiménez in 2014 and according to his note Mr. Gasca has no appreciable lung disease or pancreatic insufficiency.  Last use of prednisone and antibiotics was March 2017       - GIOVANA # of risks 1/8 = low risk  Hematology - VTE risk:  0.5%  GI - Risk of PONV score = 1.  If > 2, " anti-emetic intervention recommended.   HEENT - Pansinusitis with nasal polyps 2/2 CF, procedure planned as above.    - Anesthesia considerations:  Refer to PAC assessment in anesthesia records.  Patient has had multiple sinus surgeries in the past without difficulty.    Arrival time, NPO, shower and medication instructions provided by nursing staff today.  Preparing For Your Surgery handout given.  Patient was discussed with Dr Norton. I spent 20 minutes face to face with patient, assessing, examining, and educating.          Reviewed and Signed by PAC Mid-Level Provider/Resident  Mid-Level Provider/Resident: Whit MARCELO CNP  Date: 7/27/2017  Time: 8:01    Attending Anesthesiologist Anesthesia Assessment:  STAFF:  22 y.o. man with (limited) C.F. disease for sinus surgery  by Dr. Young using general anesthesia.   History summarized above.  Reviewed with Whit.  Good candidate for SDS.  Final plans by anesthesiology team on DOS.   ---rcp      Reviewed and Signed by PAC Anesthesiologist  Anesthesiologist: rcp  Date: 7/27  Time:   Pass/Fail: Pass  Disposition:     PAC Pharmacist Assessment:        Pharmacist:   Date:   Time:      Anesthesia Plan      History & Physical Review  History and physical reviewed and following examination; no interval change.    ASA Status:  2 .    NPO Status:  > 8 hours    Plan for General and ETT with Intravenous and Propofol induction. Maintenance will be TIVA.    PONV prophylaxis:  Ondansetron (or other 5HT-3)       Postoperative Care  Postoperative pain management:  IV analgesics.      Consents  Anesthetic plan, risks, benefits and alternatives discussed with:  Patient..                          .

## 2017-08-11 ENCOUNTER — ANESTHESIA (OUTPATIENT)
Dept: SURGERY | Facility: AMBULATORY SURGERY CENTER | Age: 22
End: 2017-08-11

## 2017-08-11 ENCOUNTER — HOSPITAL ENCOUNTER (OUTPATIENT)
Facility: AMBULATORY SURGERY CENTER | Age: 22
End: 2017-08-11
Attending: OTOLARYNGOLOGY

## 2017-08-11 VITALS
WEIGHT: 209 LBS | HEIGHT: 77 IN | TEMPERATURE: 97.9 F | HEART RATE: 77 BPM | OXYGEN SATURATION: 97 % | BODY MASS INDEX: 24.68 KG/M2 | RESPIRATION RATE: 16 BRPM | DIASTOLIC BLOOD PRESSURE: 65 MMHG | SYSTOLIC BLOOD PRESSURE: 116 MMHG

## 2017-08-11 DIAGNOSIS — E84.9 CYSTIC FIBROSIS (H): ICD-10-CM

## 2017-08-11 DIAGNOSIS — J32.4 CHRONIC PANSINUSITIS: ICD-10-CM

## 2017-08-11 DIAGNOSIS — G89.18 POST-OP PAIN: Primary | ICD-10-CM

## 2017-08-11 LAB
BACTERIA SPEC CULT: NORMAL
Lab: NORMAL
MICRO REPORT STATUS: NORMAL
SPECIMEN SOURCE: NORMAL

## 2017-08-11 DEVICE — IMP SINUS PROPEL MINI MOMETASONE FUORATE 370MCCG 16MM 60011: Type: IMPLANTABLE DEVICE | Site: NOSE | Status: FUNCTIONAL

## 2017-08-11 RX ORDER — ONDANSETRON 2 MG/ML
INJECTION INTRAMUSCULAR; INTRAVENOUS PRN
Status: DISCONTINUED | OUTPATIENT
Start: 2017-08-11 | End: 2017-08-11

## 2017-08-11 RX ORDER — GABAPENTIN 300 MG/1
300 CAPSULE ORAL ONCE
Status: DISCONTINUED | OUTPATIENT
Start: 2017-08-11 | End: 2017-08-11 | Stop reason: HOSPADM

## 2017-08-11 RX ORDER — PROPOFOL 10 MG/ML
INJECTION, EMULSION INTRAVENOUS PRN
Status: DISCONTINUED | OUTPATIENT
Start: 2017-08-11 | End: 2017-08-11

## 2017-08-11 RX ORDER — ONDANSETRON 2 MG/ML
4 INJECTION INTRAMUSCULAR; INTRAVENOUS EVERY 30 MIN PRN
Status: DISCONTINUED | OUTPATIENT
Start: 2017-08-11 | End: 2017-08-12 | Stop reason: HOSPADM

## 2017-08-11 RX ORDER — NALOXONE HYDROCHLORIDE 0.4 MG/ML
.1-.4 INJECTION, SOLUTION INTRAMUSCULAR; INTRAVENOUS; SUBCUTANEOUS
Status: DISCONTINUED | OUTPATIENT
Start: 2017-08-11 | End: 2017-08-12 | Stop reason: HOSPADM

## 2017-08-11 RX ORDER — KETOROLAC TROMETHAMINE 30 MG/ML
30 INJECTION, SOLUTION INTRAMUSCULAR; INTRAVENOUS EVERY 6 HOURS PRN
Status: DISCONTINUED | OUTPATIENT
Start: 2017-08-11 | End: 2017-08-12 | Stop reason: HOSPADM

## 2017-08-11 RX ORDER — FENTANYL CITRATE 50 UG/ML
25-50 INJECTION, SOLUTION INTRAMUSCULAR; INTRAVENOUS
Status: DISCONTINUED | OUTPATIENT
Start: 2017-08-11 | End: 2017-08-11 | Stop reason: HOSPADM

## 2017-08-11 RX ORDER — OXYMETAZOLINE HYDROCHLORIDE 0.05 G/100ML
SPRAY NASAL PRN
Status: DISCONTINUED | OUTPATIENT
Start: 2017-08-11 | End: 2017-08-11 | Stop reason: HOSPADM

## 2017-08-11 RX ORDER — HYDROCODONE BITARTRATE AND ACETAMINOPHEN 5; 325 MG/1; MG/1
1 TABLET ORAL EVERY 4 HOURS PRN
Qty: 20 TABLET | Refills: 0 | Status: SHIPPED | OUTPATIENT
Start: 2017-08-11 | End: 2017-09-06

## 2017-08-11 RX ORDER — SODIUM CHLORIDE, SODIUM LACTATE, POTASSIUM CHLORIDE, CALCIUM CHLORIDE 600; 310; 30; 20 MG/100ML; MG/100ML; MG/100ML; MG/100ML
INJECTION, SOLUTION INTRAVENOUS CONTINUOUS
Status: DISCONTINUED | OUTPATIENT
Start: 2017-08-11 | End: 2017-08-12 | Stop reason: HOSPADM

## 2017-08-11 RX ORDER — OXYMETAZOLINE HYDROCHLORIDE 0.05 G/100ML
2 SPRAY NASAL
Status: DISCONTINUED | OUTPATIENT
Start: 2017-08-11 | End: 2017-08-11 | Stop reason: HOSPADM

## 2017-08-11 RX ORDER — GABAPENTIN 300 MG/1
300 CAPSULE ORAL ONCE
Status: COMPLETED | OUTPATIENT
Start: 2017-08-11 | End: 2017-08-11

## 2017-08-11 RX ORDER — OXYCODONE HYDROCHLORIDE 5 MG/1
5-10 TABLET ORAL ONCE
Status: DISCONTINUED | OUTPATIENT
Start: 2017-08-11 | End: 2017-08-12 | Stop reason: HOSPADM

## 2017-08-11 RX ORDER — MEPERIDINE HYDROCHLORIDE 25 MG/ML
12.5 INJECTION INTRAMUSCULAR; INTRAVENOUS; SUBCUTANEOUS
Status: DISCONTINUED | OUTPATIENT
Start: 2017-08-11 | End: 2017-08-12 | Stop reason: HOSPADM

## 2017-08-11 RX ORDER — DEXAMETHASONE SODIUM PHOSPHATE 10 MG/ML
INJECTION, SOLUTION INTRAMUSCULAR; INTRAVENOUS PRN
Status: DISCONTINUED | OUTPATIENT
Start: 2017-08-11 | End: 2017-08-11

## 2017-08-11 RX ORDER — FENTANYL CITRATE 50 UG/ML
INJECTION, SOLUTION INTRAMUSCULAR; INTRAVENOUS PRN
Status: DISCONTINUED | OUTPATIENT
Start: 2017-08-11 | End: 2017-08-11

## 2017-08-11 RX ORDER — OXYMETAZOLINE HYDROCHLORIDE 0.05 G/100ML
2-3 SPRAY NASAL 2 TIMES DAILY PRN
Qty: 1 BOTTLE | Refills: 0 | Status: SHIPPED | OUTPATIENT
Start: 2017-08-11 | End: 2017-09-06

## 2017-08-11 RX ORDER — PROPOFOL 10 MG/ML
INJECTION, EMULSION INTRAVENOUS CONTINUOUS PRN
Status: DISCONTINUED | OUTPATIENT
Start: 2017-08-11 | End: 2017-08-11

## 2017-08-11 RX ORDER — LIDOCAINE HYDROCHLORIDE 20 MG/ML
INJECTION, SOLUTION INFILTRATION; PERINEURAL PRN
Status: DISCONTINUED | OUTPATIENT
Start: 2017-08-11 | End: 2017-08-11

## 2017-08-11 RX ORDER — ACETAMINOPHEN 325 MG/1
975 TABLET ORAL ONCE
Status: DISCONTINUED | OUTPATIENT
Start: 2017-08-11 | End: 2017-08-11 | Stop reason: HOSPADM

## 2017-08-11 RX ORDER — ONDANSETRON 4 MG/1
4 TABLET, ORALLY DISINTEGRATING ORAL EVERY 30 MIN PRN
Status: DISCONTINUED | OUTPATIENT
Start: 2017-08-11 | End: 2017-08-12 | Stop reason: HOSPADM

## 2017-08-11 RX ORDER — ONDANSETRON 4 MG/1
4 TABLET, ORALLY DISINTEGRATING ORAL ONCE
Status: DISCONTINUED | OUTPATIENT
Start: 2017-08-11 | End: 2017-08-12 | Stop reason: HOSPADM

## 2017-08-11 RX ORDER — SODIUM CHLORIDE, SODIUM LACTATE, POTASSIUM CHLORIDE, CALCIUM CHLORIDE 600; 310; 30; 20 MG/100ML; MG/100ML; MG/100ML; MG/100ML
INJECTION, SOLUTION INTRAVENOUS CONTINUOUS PRN
Status: DISCONTINUED | OUTPATIENT
Start: 2017-08-11 | End: 2017-08-11

## 2017-08-11 RX ORDER — ACETAMINOPHEN 325 MG/1
975 TABLET ORAL ONCE
Status: COMPLETED | OUTPATIENT
Start: 2017-08-11 | End: 2017-08-11

## 2017-08-11 RX ORDER — LEVOFLOXACIN 500 MG/1
500 TABLET, FILM COATED ORAL DAILY
Qty: 21 TABLET | Refills: 0 | Status: SHIPPED | OUTPATIENT
Start: 2017-08-11 | End: 2017-09-01

## 2017-08-11 RX ORDER — LIDOCAINE HYDROCHLORIDE AND EPINEPHRINE 10; 10 MG/ML; UG/ML
INJECTION, SOLUTION INFILTRATION; PERINEURAL PRN
Status: DISCONTINUED | OUTPATIENT
Start: 2017-08-11 | End: 2017-08-11 | Stop reason: HOSPADM

## 2017-08-11 RX ORDER — ACETAMINOPHEN 325 MG/1
650 TABLET ORAL ONCE
Status: DISCONTINUED | OUTPATIENT
Start: 2017-08-11 | End: 2017-08-12 | Stop reason: HOSPADM

## 2017-08-11 RX ORDER — FENTANYL CITRATE 50 UG/ML
25-50 INJECTION, SOLUTION INTRAMUSCULAR; INTRAVENOUS EVERY 5 MIN PRN
Status: DISCONTINUED | OUTPATIENT
Start: 2017-08-11 | End: 2017-08-11 | Stop reason: HOSPADM

## 2017-08-11 RX ADMIN — Medication 10 MG: at 09:54

## 2017-08-11 RX ADMIN — PROPOFOL 200 MCG/KG/MIN: 10 INJECTION, EMULSION INTRAVENOUS at 09:05

## 2017-08-11 RX ADMIN — ACETAMINOPHEN 975 MG: 325 TABLET ORAL at 08:18

## 2017-08-11 RX ADMIN — FENTANYL CITRATE 50 MCG: 50 INJECTION, SOLUTION INTRAMUSCULAR; INTRAVENOUS at 09:05

## 2017-08-11 RX ADMIN — ONDANSETRON 4 MG: 2 INJECTION INTRAMUSCULAR; INTRAVENOUS at 09:26

## 2017-08-11 RX ADMIN — GABAPENTIN 300 MG: 300 CAPSULE ORAL at 08:18

## 2017-08-11 RX ADMIN — SODIUM CHLORIDE, SODIUM LACTATE, POTASSIUM CHLORIDE, CALCIUM CHLORIDE: 600; 310; 30; 20 INJECTION, SOLUTION INTRAVENOUS at 09:00

## 2017-08-11 RX ADMIN — WHITE PETROLATUM MINERAL OIL 1.75 G: 150; 830 OINTMENT OPHTHALMIC at 09:14

## 2017-08-11 RX ADMIN — PROPOFOL 20 MG: 10 INJECTION, EMULSION INTRAVENOUS at 09:33

## 2017-08-11 RX ADMIN — PROPOFOL: 10 INJECTION, EMULSION INTRAVENOUS at 09:33

## 2017-08-11 RX ADMIN — LIDOCAINE HYDROCHLORIDE 100 MG: 20 INJECTION, SOLUTION INFILTRATION; PERINEURAL at 09:05

## 2017-08-11 RX ADMIN — PROPOFOL 200 MG: 10 INJECTION, EMULSION INTRAVENOUS at 09:05

## 2017-08-11 RX ADMIN — Medication 25 MG: at 09:33

## 2017-08-11 RX ADMIN — DEXAMETHASONE SODIUM PHOSPHATE 4 MG: 10 INJECTION, SOLUTION INTRAMUSCULAR; INTRAVENOUS at 09:26

## 2017-08-11 RX ADMIN — PROPOFOL: 10 INJECTION, EMULSION INTRAVENOUS at 10:10

## 2017-08-11 RX ADMIN — Medication 50 MG: at 09:05

## 2017-08-11 NOTE — ANESTHESIA POSTPROCEDURE EVALUATION
Patient: Manuel Gasca    Procedure(s):  Bilateral Total Ethmoidectomy, Sphenoidotomy, Maxillary Antrostomy and Frontal Sinusotomy and Propel Stent Placement on the Right, Stealth Guided - Wound Class: II-Clean Contaminated    Diagnosis:Sinusitis, Cystic Fibrosis  Diagnosis Additional Information: No value filed.    Anesthesia Type:  General, ETT    Note:  Anesthesia Post Evaluation    Patient location during evaluation: PACU  Patient participation: Able to fully participate in evaluation  Level of consciousness: awake and alert  Pain management: adequate  Airway patency: patent  Cardiovascular status: hemodynamically stable  Respiratory status: acceptable  Hydration status: stable  PONV: none     Anesthetic complications: None          Last vitals:  Vitals:    08/11/17 0736   BP: 117/77   Resp: 16   Temp: 36.5  C (97.7  F)   SpO2: 100%         Electronically Signed By: Justo Valentin MD  August 11, 2017  10:56 AM

## 2017-08-11 NOTE — BRIEF OP NOTE
[facility]    Brief Operative Note    Pre-operative diagnosis: Sinusitis, Cystic Fibrosis  Post-operative diagnosis Sinusitis, Cystic Fibrosis  Procedure: Procedure(s):  Bilateral Total Ethmoidectomy, Sphenoidotomy, Maxillary Antrostomy and Frontal Sinusotomy and Propel Stent Placement on the Right, Stealth Guided - Wound Class: II-Clean Contaminated  Surgeon: Surgeon(s) and Role:     * Fay Young MD - Primary  Anesthesia: General   Estimated blood loss: * No values recorded between 8/11/2017  9:17 AM and 8/11/2017 10:32 AM *  Drains: None  Specimens:   ID Type Source Tests Collected by Time Destination   1 : cystic fibrosis Tissue Other TISSUE CULTURE AEROBIC BACTERIAL Fay Young MD 8/11/2017 10:10 AM    A : sinus contents Tissue Other SURGICAL PATHOLOGY EXAM Fay Young MD 8/11/2017 10:26 AM      Findings:   Chronic inflamed mucosa in all paranasal sinuses  Complications: None.  Implants: Propel stent x 2

## 2017-08-11 NOTE — DISCHARGE INSTRUCTIONS
[facility]  Home Care Following Anesthesia  For 24 hours after surgery:  1. Get plenty of rest.  A responsible adult must stay with you for at least 24 hours after you leave the surgery center.  2. Do not drive or use heavy equipment.  If you have weakness or tingling, don't drive or use heavy equipment until this feeling goes away.   3. Do not drink alcohol.   4. Avoid strenuous or risky activities.  Ask for help when climbing stairs.  5. You may feel lightheaded.  IF so, sit for a few minutes before standing.  Have someone help you get up.   6. If you have nausea (feel sick to your stomach): Drink only clear liquids such as apple juice, ginger ale, broth or 7-Up.  Rest may also help.  Be sure to drink enough fluids.  Move to a regular diet as you feel able.   7. You may have a slight fever.  Call the doctor if your fever is over 100 F (37.7 C) (taken under the tongue) or lasts longer than 24 hours.  8. You may have a dry mouth, a sore throat, muscle aches or trouble sleeping. These should go away after 24 hours.  9. Do not make important or legal decisions.       Tips for taking pain medications  To get the best pain relief possible, remember these points:    Take pain medications as directed, before pain becomes severe.    Pain medication can upset your stomach: taking it with food may help.    Constipation is a common side effect of pain medication. Drink plenty of  fluids.    Eat foods high in fiber. Take a stool softener if recommended by your doctor or pharmacist.    Do not drink alcohol, drive or operate machinery while taking pain medications.    Ask about other ways to control pain, such as with heat, ice or relaxation.    Tylenol/Acetaminophen Consumption  To help encourage the safe use of acetaminophen, the makers of TYLENOL  have lowered the maximum daily dose for single-ingredient Extra Strength TYLENOL  (acetaminophen) products sold in the [place] from 8 pills per  day (4,000 mg) to 6 pills per day (3,000 mg). The dosing interval has also changed from 2 pills every 4-6 hours to 2 pills every 6 hours.    If you feel your pain relief is insufficient, you may take Tylenol/Acetaminophen in addition to your narcotic pain medication.     Be careful not to exceed 3,000 mg of Tylenol/Acetaminophen in a 24 hour period from all sources.    If you are taking extra strength Tylenol/acetaminophen (500 mg), the maximum dose is 6 tablets in 24 hours.    If you are taking regular strength acetaminophen (325 mg), the maximum dose is 9 tablets in 24 hours.    Call a doctor for any of the followin. Signs of infection (fever, growing tenderness at the surgery site, a large amount of drainage or bleeding, severe pain, foul-smelling drainage, redness, swelling).  2. It has been over 8 to 10 hours since surgery and you are still not able to urinate (pass water).  3. Headache for over 24 hours.  Your doctor is:  Dr. Fay Young, ENT Otolaryngology: [phone]                  Or dial [phone] and ask for the resident on call for:  ENT Otolaryngology  For emergency care, call the:  [facility]:  [phone] (TTY for hearing impaired: [phone])

## 2017-08-11 NOTE — ANESTHESIA CARE TRANSFER NOTE
Patient: Manuel Gasca    Procedure(s):  Bilateral Total Ethmoidectomy, Sphenoidotomy, Maxillary Antrostomy and Frontal Sinusotomy and Propel Stent Placement on the Right, Stealth Guided - Wound Class: II-Clean Contaminated    Diagnosis: Sinusitis, Cystic Fibrosis  Diagnosis Additional Information: No value filed.    Anesthesia Type:   General, ETT     Note:  Airway :Face Mask  Patient transferred to:PACU  Comments: To PACU  Sleeping, spont repirs w/o airway adjunct.   Airway reflexes intact - swallowing/cough.   VSS     Report to CONCHA RN      Vitals: (Last set prior to Anesthesia Care Transfer)    CRNA VITALS  8/11/2017 1017 - 8/11/2017 1054      8/11/2017             Resp Rate (set): 10                Electronically Signed By: FOZIA Calvillo CRNA  August 11, 2017  10:54 AM

## 2017-08-11 NOTE — OP NOTE
PREOPERATIVE DIAGNOSIS:  Chronic rhinosinusitis related to cystic fibrosis.      POSTOPERATIVE DIAGNOSIS:  Chronic rhinosinusitis related to cystic fibrosis.      PROCEDURE:  Bilateral frontal sinusotomy, total ethmoidectomy, maxillary antrostomy and sphenoidotomy under image guidance.      SURGEON:  Fay Young MD      ASSISTANT:  Coleman Lay MD and Jimmy Bunch MD      ANESTHESIA:  General endotracheal.      BLOOD LOSS:  100 mL.      REPLACEMENT:  Crystalloid.      COMPLICATIONS:  None.      FINDINGS:  Extensive nasal polyposis and severe sinus disease related to cystic fibrosis.      OPERATIVE PROCEDURE:  After risks and benefits were explained, the patient's signed consent was obtained.  He was taken to the operating room and placed supine on the table.  General anesthesia was administered.  He was endotracheally intubated and sterilely draped.  The navigation headset was placed.  Registration, calibration and verification for accuracy was performed prior to the procedure.  Image guidance was used throughout the procedure.  He has had extensive previous surgery and this is necessary due to the alteration of natural landmarks.  A zero-degree rigid endoscope was used for visualization.  The nose was injected with 1% lidocaine with epinephrine.  The shaver was used to remove extensive polypoid disease from the maxillary antrum, the entire ethmoid labyrinth, the opening to the sphenoid sinus and the opening to the frontal sinus on both sides.  This takes extensive dissection given the alteration of his landmarks and the severity of disease.  There was a moderate amount of bleeding and there was very difficult visualization.  After the sinus cavities were widely opened on both sides, mostly using a shaver but using some cut through instruments, then copious irrigation was performed.  We irrigated with approximately a liter of normal saline throughout the sinonasal cavities and at the end we placed Propel  steroid-eluting stents in the frontal recess.  We have done this before and this has given him longer term benefit to keep his frontal sinuses open, and given the severity of disease today, we felt that it would be very beneficial.  The patient was turned over to Anesthesia for wake up.         KHALIF MIRELES MD             D: 2017 13:33   T: 2017 14:04   MT: emili      Name:     STU COLUNGA   MRN:      [id number]        Account:        [id number]   :      1995           Procedure Date: 2017      Document: [id number]

## 2017-08-11 NOTE — IP AVS SNAPSHOT
[facility]    [address] [place] [address]    Phone:  [phone]    Fax:  [phone]                                       After Visit Summary   8/11/2017    Manuel Gasca    MRN: [id number]           After Visit Summary Signature Page     I have received my discharge instructions, and my questions have been answered. I have discussed any challenges I see with this plan with the nurse or doctor.    ..........................................................................................................................................  Patient/Patient Representative Signature      ..........................................................................................................................................  Patient Representative Print Name and Relationship to Patient    ..................................................               ................................................  Date                                            Time    ..........................................................................................................................................  Reviewed by Signature/Title    ...................................................              ..............................................  Date                                                            Time

## 2017-08-11 NOTE — IP AVS SNAPSHOT
MRN:[id number]                      After Visit Summary   8/11/2017    Manuel Gasca    MRN: [id number]           Thank you!     Thank you for choosing [facility] for your care. Our goal is always to provide you with excellent care. Hearing back from our patients is one way we can continue to improve our services. Please take a few minutes to complete the written survey that you may receive in the mail after you visit with us. Thank you!        Patient Information     Date Of Birth          1995        About your hospital stay     You were admitted on:  August 11, 2017 [facility]    You were discharged on:  August 11, 2017       Who to Call     For medical emergencies, please call 911.  For non-urgent questions about your medical care, please call your primary care provider or clinic, [phone]  For questions related to your surgery, please call your surgery clinic        Attending Provider     Provider Fay Payne MD Otolaryngology       Primary Care Provider Office Phone # Fax #    Delbert Jennings [phone]      After Care Instructions     Diet Instructions       Resume pre-procedure diet            Discharge Instructions       You have a follow-up appointment with Dr. Young scheduled for 8/23/17 at 2:45 at the [facility]. If you have any questions, please call [phone].    Please practice Nasal Precautions:  - Cough and sneeze with mouth open  - No nose blowing for 2 weeks after your procedure  - Nasal Saline spray every 2 hours while awake            NO Lifting       No lifting over 10 lbs and no strenuous physical activity for 2 weeks.            No Alcohol       For 24 hours post procedure            No blowing nose           No driving or operating machinery        until the day after procedure                  Your next 10 appointments already  scheduled     Aug 23, 2017  2:45 PM CDT   (Arrive by 2:30 PM)   Return Visit with Fay Young MD   [facility] ([facility])    [address] [address] [phone]              Further instructions from your care team       [facility]  Home Care Following Anesthesia  For 24 hours after surgery:  1. Get plenty of rest.  A responsible adult must stay with you for at least 24 hours after you leave the surgery center.  2. Do not drive or use heavy equipment.  If you have weakness or tingling, don't drive or use heavy equipment until this feeling goes away.   3. Do not drink alcohol.   4. Avoid strenuous or risky activities.  Ask for help when climbing stairs.  5. You may feel lightheaded.  IF so, sit for a few minutes before standing.  Have someone help you get up.   6. If you have nausea (feel sick to your stomach): Drink only clear liquids such as apple juice, ginger ale, broth or 7-Up.  Rest may also help.  Be sure to drink enough fluids.  Move to a regular diet as you feel able.   7. You may have a slight fever.  Call the doctor if your fever is over 100 F (37.7 C) (taken under the tongue) or lasts longer than 24 hours.  8. You may have a dry mouth, a sore throat, muscle aches or trouble sleeping. These should go away after 24 hours.  9. Do not make important or legal decisions.       Tips for taking pain medications  To get the best pain relief possible, remember these points:    Take pain medications as directed, before pain becomes severe.    Pain medication can upset your stomach: taking it with food may help.    Constipation is a common side effect of pain medication. Drink plenty of  fluids.    Eat foods high in fiber. Take a stool softener if recommended by your doctor or pharmacist.    Do not drink alcohol, drive or operate machinery while taking pain medications.    Ask about other ways to  control pain, such as with heat, ice or relaxation.    Tylenol/Acetaminophen Consumption  To help encourage the safe use of acetaminophen, the makers of TYLENOL  have lowered the maximum daily dose for single-ingredient Extra Strength TYLENOL  (acetaminophen) products sold in the [place] from 8 pills per day (4,000 mg) to 6 pills per day (3,000 mg). The dosing interval has also changed from 2 pills every 4-6 hours to 2 pills every 6 hours.    If you feel your pain relief is insufficient, you may take Tylenol/Acetaminophen in addition to your narcotic pain medication.     Be careful not to exceed 3,000 mg of Tylenol/Acetaminophen in a 24 hour period from all sources.    If you are taking extra strength Tylenol/acetaminophen (500 mg), the maximum dose is 6 tablets in 24 hours.    If you are taking regular strength acetaminophen (325 mg), the maximum dose is 9 tablets in 24 hours.    Call a doctor for any of the followin. Signs of infection (fever, growing tenderness at the surgery site, a large amount of drainage or bleeding, severe pain, foul-smelling drainage, redness, swelling).  2. It has been over 8 to 10 hours since surgery and you are still not able to urinate (pass water).  3. Headache for over 24 hours.  Your doctor is:  Dr. Fay Young, ENT Otolaryngology: [phone]                  Or dial [phone] and ask for the resident on call for:  ENT Otolaryngology  For emergency care, call the:  [facility]:  [phone] (TTY for hearing impaired: [phone])                Pending Results     Date and Time Order Name Status Description    2017 1026 Surgical pathology exam In process     2017 1013 Tissue Culture Aerobic Bacterial In process             Admission Information     Date & Time Provider Department Dept. Phone    2017 Fay Young MD [facility] [phone]      Your Vitals Were     Blood Pressure Temperature Respirations Height  "Weight Pulse Oximetry    125/71 97.2  F (36.2  C) (Temporal) 16 1.956 m (6' 5\") 94.8 kg (209 lb) 100%    BMI (Body Mass Index)                   24.78 kg/m2           MyChart Information     Andela is an electronic gateway that provides easy, online access to your medical records. With Andela, you can request a clinic appointment, read your test results, renew a prescription or communicate with your care team.     To sign up for Andela visit the website at [email or web address]   You will be asked to enter the access code listed below, as well as some personal information. Please follow the directions to create your username and password.     Your access code is: [email or web address]  Expires: 10/3/2017  6:30 AM     Your access code will  in 90 days. If you need help or a new code, please contact your [facility] or call [phone] for assistance.        Care EveryWhere ID     This is your Care EveryWhere ID. This could be used by other organizations to access your [place] medical records  [id number]        Equal Access to Services     BRITTANY HER : Sravan Mckay, katlyn bowman, nolan garcia, lenora bella . So [facility] [phone].    ATENCIÓN: Si habla español, tiene a toledo disposición servicios gratuitos de asistencia lingüística. Dorina al [phone].    We comply with applicable federal civil rights laws and [place] laws. We do not discriminate on the basis of race, color, national origin, age, disability sex, sexual orientation or gender identity.               Review of your medicines      START taking        Dose / Directions    HYDROcodone-acetaminophen 5-325 MG per tablet   Commonly known as:  NORCO   Used for:  Post-op pain        Dose:  1 tablet   Take 1 tablet by mouth every 4 hours as needed for moderate to severe pain maximum 6 tablet(s) per day   Quantity:  20 tablet   Refills:  0       oxymetazoline " 0.05 % spray   Commonly known as:  AFRIN NASAL SPRAY   Used for:  Post-op pain        Dose:  2-3 spray   Spray 2-3 sprays into both nostrils 2 times daily as needed (Nose bleed)   Quantity:  1 Bottle   Refills:  0         CONTINUE these medicines which have NOT CHANGED        Dose / Directions    fluticasone 50 MCG/ACT spray   Commonly known as:  FLONASE   Used for:  Cystic fibrosis with pulmonary manifestations (H)        Dose:  2 spray   Spray 2 sprays into both nostrils daily   Quantity:  1 Bottle   Refills:  11       levofloxacin 500 MG tablet   Commonly known as:  LEVAQUIN   Used for:  Cystic fibrosis (H)        Dose:  500 mg   Take 1 tablet (500 mg) by mouth daily for 21 days   Quantity:  21 tablet   Refills:  0       methylPREDNISolone 4 MG tablet   Commonly known as:  MEDROL DOSEPAK   Used for:  Cystic fibrosis with pulmonary manifestations (H), Chronic frontal sinusitis        Take as directed per patient instruction card   Quantity:  21 tablet   Refills:  1       sodium chloride 0.65 % nasal spray   Commonly known as:  OCEAN   Used for:  Chronic pansinusitis        Dose:  1-2 spray   Spray 1-2 sprays in nostril every hour as needed for congestion (nasal dryness) Use in EACH nostril.   Quantity:  1 Bottle   Refills:  1         STOP taking     predniSONE 20 MG tablet   Commonly known as:  DELTASONE                Where to get your medicines      These medications were sent to [address] [address]    Hours:  TRANSPLANT PHONE NUMBER [phone] Phone:  [phone]     levofloxacin 500 MG tablet    oxymetazoline 0.05 % spray    sodium chloride 0.65 % nasal spray         Some of these will need a paper prescription and others can be bought over the counter. Ask your nurse if you have questions.     Bring a paper prescription for each of these medications     HYDROcodone-acetaminophen 5-325 MG per  tablet                Protect others around you: Learn how to safely use, store and throw away your medicines at www.disposemymeds.org.             Medication List: This is a list of all your medications and when to take them. Check marks below indicate your daily home schedule. Keep this list as a reference.      Medications           Morning Afternoon Evening Bedtime As Needed    fluticasone 50 MCG/ACT spray   Commonly known as:  FLONASE   Spray 2 sprays into both nostrils daily                                HYDROcodone-acetaminophen 5-325 MG per tablet   Commonly known as:  NORCO   Take 1 tablet by mouth every 4 hours as needed for moderate to severe pain maximum 6 tablet(s) per day                                levofloxacin 500 MG tablet   Commonly known as:  LEVAQUIN   Take 1 tablet (500 mg) by mouth daily for 21 days                                methylPREDNISolone 4 MG tablet   Commonly known as:  MEDROL DOSEPAK   Take as directed per patient instruction card                                oxymetazoline 0.05 % spray   Commonly known as:  AFRIN NASAL SPRAY   Spray 2-3 sprays into both nostrils 2 times daily as needed (Nose bleed)   Last time this was given:  30 mLs on 8/11/2017  9:23 AM                                sodium chloride 0.65 % nasal spray   Commonly known as:  OCEAN   Spray 1-2 sprays in nostril every hour as needed for congestion (nasal dryness) Use in EACH nostril.

## 2017-08-14 LAB — COPATH REPORT: NORMAL

## 2017-08-16 LAB
BACTERIA SPEC CULT: ABNORMAL
BACTERIA SPEC CULT: ABNORMAL
CEFOXITIN SCREEN: NEGATIVE
CEFOXITIN SCREEN: NEGATIVE
CIPROFLOXACIN SUSC ISLT: <=0.5 UG/ML
CIPROFLOXACIN SUSC ISLT: <=0.5 UG/ML
CLINDAMYCIN SUSC ISLT: <=0.25 UG/ML
CLINDAMYCIN SUSC ISLT: <=0.25 UG/ML
ERYTHROMYCIN SUSC ISLT: 0.5 UG/ML
ERYTHROMYCIN SUSC ISLT: 1 UG/ML
GENTAMICIN SUSC ISLT: <=0.5 UG/ML
GENTAMICIN SUSC ISLT: <=0.5 UG/ML
LEVOFLOXACIN SUSC ISLT: 0.25 UG/ML
LEVOFLOXACIN SUSC ISLT: <=0.12 UG/ML
LINEZOLID SUSC ISLT: >8 UG/ML
LINEZOLID SUSC ISLT: >8 UG/ML
MOXIFLOXACIN SUSC ISLT: <=0.25 UG/ML
MOXIFLOXACIN SUSC ISLT: <=0.25 UG/ML
OXACILLIN SUSC ISLT: <=0.25 UG/ML
OXACILLIN SUSC ISLT: <=0.25 UG/ML
PENICILLIN SUSC ISLT: ABNORMAL UG/ML
PENICILLIN SUSC ISLT: ABNORMAL UG/ML
QUINUPRISTIN+DALFOPRIST SUSC ISLT: 0.5 UG/ML
QUINUPRISTIN+DALFOPRIST SUSC ISLT: 0.5 UG/ML
RIFAMPIN SUSC ISLT: <=0.5 UG/ML
RIFAMPIN SUSC ISLT: <=0.5 UG/ML
SPECIMEN SOURCE: ABNORMAL
TETRACYCLINE SUSC ISLT: 2 UG/ML
TETRACYCLINE SUSC ISLT: 2 UG/ML
TIGECYCLINE SUSC ISLT: <=0.12 UG/ML
TIGECYCLINE SUSC ISLT: <=0.12 UG/ML
TMP SMX SUSC ISLT: ABNORMAL UG/ML
TMP SMX SUSC ISLT: ABNORMAL UG/ML
VANCOMYCIN SUSC ISLT: 1 UG/ML
VANCOMYCIN SUSC ISLT: <=0.5 UG/ML

## 2017-08-23 ENCOUNTER — OFFICE VISIT (OUTPATIENT)
Dept: OTOLARYNGOLOGY | Facility: CLINIC | Age: 22
End: 2017-08-23

## 2017-08-23 VITALS — HEIGHT: 77 IN | WEIGHT: 209 LBS | BODY MASS INDEX: 24.68 KG/M2

## 2017-08-23 DIAGNOSIS — J32.4 CHRONIC PANSINUSITIS: Primary | ICD-10-CM

## 2017-08-23 DIAGNOSIS — E84.0 CYSTIC FIBROSIS WITH PULMONARY MANIFESTATIONS (H): ICD-10-CM

## 2017-08-23 NOTE — LETTER
8/23/2017       RE: Manuel Gasca  [address] [place] [id number]     Dear Colleague,    Thank you for referring your patient, Manuel Gasca, to the [facility] at [facility]. Please see a copy of my visit note below.    Matt is s/p revision FESS for CRS related to CF.  He feels like he is doing very well.  Is irrigating aggressively.  He had severe infection/inflammation at the time of surgery.    Exam:  Ptosis on R improved.    Endoscopy done for debridement - topical anesthetic applied.  Bilateral middle meatus cleaned of dry debri and secretions.  R middle meatus with early polypoid changes.  Frontal sinus stents in place, debrided crusts from around them.      Overall healing well, but R shows inflammation and infection.  Continue levaquin, see me again in 1-2 weeks for debridement.      Again, thank you for allowing me to participate in the care of your patient.      Sincerely,    Fay Young MD

## 2017-08-23 NOTE — NURSING NOTE
Chief Complaint   Patient presents with     RECHECK     post op 8/11/17 sinus surgery     Jessica Yoo, RN Care Coordinator  [facility] Otolaryngology/Head & Neck Surgery  Direct contact: [phone]

## 2017-08-24 NOTE — PROGRESS NOTES
Matt is s/p revision FESS for CRS related to CF.  He feels like he is doing very well.  Is irrigating aggressively.  He had severe infection/inflammation at the time of surgery.    Exam:  Ptosis on R improved.    Endoscopy done for debridement - topical anesthetic applied.  Bilateral middle meatus cleaned of dry debri and secretions.  R middle meatus with early polypoid changes.  Frontal sinus stents in place, debrided crusts from around them.      Overall healing well, but R shows inflammation and infection.  Continue levaquin, see me again in 1-2 weeks for debridement.

## 2017-09-06 ENCOUNTER — OFFICE VISIT (OUTPATIENT)
Dept: OTOLARYNGOLOGY | Facility: CLINIC | Age: 22
End: 2017-09-06

## 2017-09-06 VITALS — WEIGHT: 209 LBS | HEIGHT: 76 IN | BODY MASS INDEX: 25.45 KG/M2

## 2017-09-06 DIAGNOSIS — J32.4 CHRONIC PANSINUSITIS: ICD-10-CM

## 2017-09-06 DIAGNOSIS — A42.9 ACTINOMYCES INFECTION: Primary | ICD-10-CM

## 2017-09-06 DIAGNOSIS — E84.0 CYSTIC FIBROSIS WITH PULMONARY MANIFESTATIONS (H): ICD-10-CM

## 2017-09-06 RX ORDER — PREDNISONE 20 MG/1
TABLET ORAL
Qty: 15 TABLET | Refills: 1 | Status: SHIPPED | OUTPATIENT
Start: 2017-09-06 | End: 2018-11-16

## 2017-09-06 RX ORDER — DOXYCYCLINE 100 MG/1
100 CAPSULE ORAL 2 TIMES DAILY
Qty: 42 CAPSULE | Refills: 0 | Status: SHIPPED | OUTPATIENT
Start: 2017-09-06 | End: 2017-09-27

## 2017-09-06 ASSESSMENT — PAIN SCALES - GENERAL: PAINLEVEL: NO PAIN (0)

## 2017-09-06 NOTE — PROGRESS NOTES
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

## 2017-09-06 NOTE — LETTER
9/6/2017       RE: Manuel Gasca  [address] [place] [id number]     Dear Colleague,    Thank you for referring your patient, Manuel Gasca, to the [facility] EAR NOSE AND THROAT at [facility]. Please see a copy of my visit note below.    HISTORY OF PRESENT ILLNESS:  Manuel Gasca has chronic sinusitis related to cystic fibrosis.  He presents today for a second postoperative visit.  He has steroid eluting stents in his frontal recesses bilaterally.  He is feeling congested, and he feels some pressure over his right eye.      PHYSICAL EXAMINATION:  He is examined today.      PROCEDURE:  In order to remove the stents, nasal endoscopy is indicated.  Topical anesthetic decongestant solution is applied, and a 0-degree rigid scope is used.  On the right side, I performed  significant debridement of the frontal recess.  I was able to pass the olive-tip suction catheter into the frontal sinus and suction out some mucopurulent secretions.  I dilated and debrided the recess and the frontal sinus ostium.  On the left side, a similar procedure was performed.  Again, I debrided the stent, dilated the frontal recess, and suctioned the frontal sinus.      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  I also conferred with Dr. Jiménez, and we are going to treat his Staph aureus and potentially the actinomyces with a course of doxycycline.  I will put Manuel on doxycycline for three weeks.  I am also going to give him a quick boost of prednisone to reduce inflammation in his frontal recess to hopefully keep it patent.  I will see him back again in the next couple of weeks to repeat sinus debridement.  He seems comfortable with this plan.     Sincerely,    Fay Young MD

## 2017-09-06 NOTE — NURSING NOTE
Chief Complaint   Patient presents with     RECHECK     follow up sinus issues     Cliff Rubio LPN

## 2017-09-06 NOTE — PATIENT INSTRUCTIONS
Plan of care:  Doxycycline 100mg BID x 21 days  Prednisone 40mg/day x 5 days then 20mg/day x 5 days  Referral to infectious disease  Clinic contact information:  1. To schedule an appointment call [phone], option 1  2. To talk to the Triage RN call [phone], option 3  3. If you need to speak to Mignon or get a message to your doctor on a Friday, call the triage RN  4. MignonRN: [phone]  5. Surgery scheduling:      Jessika Khan: [phone]      Janie Rodriguez: [phone]  6. Fax: [phone]  7. [phone]

## 2017-09-06 NOTE — PROGRESS NOTES
HISTORY OF PRESENT ILLNESS:  Manuel Gasca has chronic sinusitis related to cystic fibrosis.  He presents today for a second postoperative visit.  He has steroid eluting stents in his frontal recesses bilaterally.  He is feeling congested, and he feels some pressure over his right eye.      PHYSICAL EXAMINATION:  He is examined today. He is alert and oriented, the R eye seems a bit depressed.  Anterior rhinoscopy shows crusting and debri, therefore endoscopy is needed to clean the sinus cavities.     PROCEDURE:  In order to remove the stents, nasal endoscopy is indicated.  Topical anesthetic decongestant solution is applied, and a 0-degree rigid scope is used.  On the right side, I performed  significant debridement of the frontal recess.  I was able to pass the olive-tip suction catheter into the frontal sinus and suction out some mucopurulent secretions.  I dilated and debrided the recess and the frontal sinus ostium.  On the left side, a similar procedure was performed.  Again, I debrided the stent, dilated the frontal recess, and suctioned the frontal sinus.      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  I also conferred with Dr. Jiménez, and we are going to treat his Staph aureus and potentially the actinomyces with a course of doxycycline.  I will put Manuel on doxycycline for three weeks.  I am also going to give him a quick boost of prednisone to reduce inflammation in his frontal recess to hopefully keep it patent.  I will see him back again in the next couple of weeks to repeat sinus debridement.  He seems comfortable with this plan.

## 2017-09-06 NOTE — MR AVS SNAPSHOT
After Visit Summary   2017    Manuel Gasca    MRN: [id number]           Patient Information     Date Of Birth          1995        Visit Information        Provider Department      2017 8:00 AM Fay Young MD [facility] Ear Nose and Throat        Today's Diagnoses     Actinomyces infection    -  1      Care Instructions    Plan of care:  Doxycycline 100mg BID x 21 days  Prednisone 40mg/day x 5 days then 20mg/day x 5 days  Referral to infectious disease  Clinic contact information:  1. To schedule an appointment call [phone], option 1  2. To talk to the Triage RN call [phone], option 3  3. If you need to speak to Mignon or get a message to your doctor on a Friday, call the triage RN  4. MARCO A Crow: [phone]  5. Surgery scheduling:      Jessika Khan: [phone]      Janie Jennifer: [phone]  6. Fax: [phone]  7. [phone]            Follow-ups after your visit        Additional Services     INFECTIOUS DISEASE REFERRAL       Your provider has referred you to: [facility]: [facility] (Infectious Disease and HIV Clinic) - [place] [phone]   [email or web address]    Please be aware that coverage of these services is subject to the terms and limitations of your health insurance plan.  Call member services at your health plan with any benefit or coverage questions.      Per Dr Young: actinomyces in sinuses - also staph    (1) Any X-Rays, CTs or MRIs which have been performed.  Contact the facility where they were done to arrange for  prior to your scheduled appointment.    (2) List of current medications   (3) This referral request   (4) Any documents/labs given to you for this referral                  Your next 10 appointments already scheduled     Sep 20, 2017 10:00 AM CDT   (Arrive by 9:45 AM)   Return Visit with MD HELEN Dougherty [facility] Ear Nose and Throat ([facility] and  "Surgery Center)    [address]  [address] [facility] [id number] [phone]            Oct 05, 2017  8:30 AM CDT   (Arrive by 8:15 AM)   New Patient Visit with Zari Wilson MD   [facility] and Infectious Diseases ([facility] Center)    [address]  [address] [facility] [id number] [phone]              Who to contact     Please call your clinic at [phone] to:    Ask questions about your health    Make or cancel appointments    Discuss your medicines    Learn about your test results    Speak to your doctor   If you have compliments or concerns about an experience at your clinic, or if you wish to file a complaint, please contact [facility] Patient Relations at [phone] or email us at [email or web address]         Additional Information About Your Visit        MyChart Information     [employer] is an electronic gateway that provides easy, online access to your medical records. With [employer], you can request a clinic appointment, read your test results, renew a prescription or communicate with your care team.     To sign up for [employer] visit the website at [email or web address]   You will be asked to enter the access code listed below, as well as some personal information. Please follow the directions to create your username and password.     Your access code is: [email or web address]  Expires: 10/3/2017  6:30 AM     Your access code will  in 90 days. If you need help or a new code, please contact your [facility] or call [phone] for assistance.        Care EveryWhere ID     This is your Care EveryWhere ID. This could be used by other organizations to access your [facility] medical records  [id number]        Your Vitals Were     Height BMI (Body Mass Index)                1.94 m (6' 4.38\") 25.19 kg/m2           Blood Pressure from Last 3 Encounters: "   08/11/17 116/65   07/27/17 124/76   10/07/16 121/65    Weight from Last 3 Encounters:   09/06/17 94.8 kg (209 lb)   08/23/17 94.8 kg (208 lb 15.9 oz)   08/11/17 94.8 kg (209 lb)              We Performed the Following     INFECTIOUS DISEASE REFERRAL          Today's Medication Changes          These changes are accurate as of: 9/6/17  9:55 AM.  If you have any questions, ask your nurse or doctor.               Start taking these medicines.        Dose/Directions    doxycycline 100 MG capsule   Commonly known as:  VIBRAMYCIN   Used for:  Actinomyces infection   Started by:  Fay Young MD        Dose:  100 mg   Take 1 capsule (100 mg) by mouth 2 times daily for 21 days   Quantity:  42 capsule   Refills:  0       predniSONE 20 MG tablet   Commonly known as:  DELTASONE   Used for:  Actinomyces infection   Started by:  Fay Young MD        40mg/day x 5 days then 20mg/day x 5 days   Quantity:  15 tablet   Refills:  1            Where to get your medicines      These medications were sent to [place] [address]     Phone:  [phone]     doxycycline 100 MG capsule    predniSONE 20 MG tablet                Primary Care Provider Office Phone # Fax #    Delbert Jennings [phone]       [facility] [address] [facility] [address]        Equal Access to Services     [facility] HospitalJUDY AH: Hadii agusto engo Soemiliano, waaxda luqadaha, qaybta kaalmada adebrannonyada, lenora schneider. So [facility] [phone].    ATENCIÓN: Si habla español, tiene a toledo disposición servicios gratuitos de asistencia lingüística. Llame al [phone].    We comply with applicable federal civil rights laws and [place] laws. We do not discriminate on the basis of race, color, national origin, age, disability sex, sexual orientation or gender identity.            Thank you!     Thank you for choosing [facility] EAR NOSE  AND THROAT  for your care. Our goal is always to provide you with excellent care. Hearing back from our patients is one way we can continue to improve our services. Please take a few minutes to complete the written survey that you may receive in the mail after your visit with us. Thank you!             Your Updated Medication List - Protect others around you: Learn how to safely use, store and throw away your medicines at www.disposemymeds.org.          This list is accurate as of: 9/6/17  9:55 AM.  Always use your most recent med list.                   Brand Name Dispense Instructions for use Diagnosis    doxycycline 100 MG capsule    VIBRAMYCIN    42 capsule    Take 1 capsule (100 mg) by mouth 2 times daily for 21 days    Actinomyces infection       methylPREDNISolone 4 MG tablet    MEDROL DOSEPAK    21 tablet    Take as directed per patient instruction card    Cystic fibrosis with pulmonary manifestations (H), Chronic frontal sinusitis       predniSONE 20 MG tablet    DELTASONE    15 tablet    40mg/day x 5 days then 20mg/day x 5 days    Actinomyces infection

## 2017-09-20 ENCOUNTER — OFFICE VISIT (OUTPATIENT)
Dept: OTOLARYNGOLOGY | Facility: CLINIC | Age: 22
End: 2017-09-20

## 2017-09-20 DIAGNOSIS — E84.0 CYSTIC FIBROSIS WITH PULMONARY MANIFESTATIONS (H): ICD-10-CM

## 2017-09-20 DIAGNOSIS — J32.4 CHRONIC PANSINUSITIS: Primary | ICD-10-CM

## 2017-09-20 ASSESSMENT — PAIN SCALES - GENERAL: PAINLEVEL: NO PAIN (0)

## 2017-09-20 NOTE — MR AVS SNAPSHOT
After Visit Summary   2017    Manuel Gasca    MRN: [id number]           Patient Information     Date Of Birth          1995        Visit Information        Provider Department      2017 10:00 AM Fay Young MD [facility] Ear Nose and Throat        Care Instructions    Plan of care:  Follow up with DR Young on 10/11 at 0800  Clinic contact information:  1. To schedule an appointment call [phone], option 1  2. To talk to the Triage RN call [phone], option 3  3. If you need to speak to Mignon or get a message to your doctor on a Friday, call the triage RN  4. MignonRN: [phone]  5. Surgery scheduling:      Jessiak Khan: [phone]      Janie Rodriguez: [phone]  6. Fax: [phone]  7. [phone]            Follow-ups after your visit        Your next 10 appointments already scheduled     Oct 05, 2017  8:30 AM CDT   (Arrive by 8:15 AM)   New Patient Visit with Zari Wilson MD   [facility] and Infectious Diseases ([facility] [place])    [address] [place] [id number] [phone]              Who to contact     Please call your clinic at [phone] to:    Ask questions about your health    Make or cancel appointments    Discuss your medicines    Learn about your test results    Speak to your doctor   If you have compliments or concerns about an experience at your clinic, or if you wish to file a complaint, please contact [facility] Patient Relations at [phone] or email us at [email or web address]         Additional Information About Your Visit        AFFiRiShart Information     [employer] is an electronic gateway that provides easy, online access to your medical records. With [employer], you can request a clinic appointment, read your test results, renew a prescription or communicate with your care team.     To sign up for [employer] visit the  website at [email or web address]   You will be asked to enter the access code listed below, as well as some personal information. Please follow the directions to create your username and password.     Your access code is: [email or web address]  Expires: 10/3/2017  6:30 AM     Your access code will  in 90 days. If you need help or a new code, please contact your [facility] or call [phone] for assistance.        Care EveryWhere ID     This is your Care EveryWhere ID. This could be used by other organizations to access your [facility] medical records  [id number]         Blood Pressure from Last 3 Encounters:   17 116/65   17 124/76   10/07/16 121/65    Weight from Last 3 Encounters:   17 94.8 kg (209 lb)   17 94.8 kg (208 lb 15.9 oz)   17 94.8 kg (209 lb)              Today, you had the following     No orders found for display       Primary Care Provider Office Phone # Fax #    Delbert Jennings [phone]       [facility] [address]  [facility] [address]        Equal Access to Services     BRITTANY HER : Hadii agusto ku hadasho Soomaali, waaxda luqadaha, qaybta kaalmada adeegyada, lenora schneider. So [facility] [phone].    ATENCIÓN: Si habla español, tiene a toledo disposición servicios gratuitos de asistencia lingüística. Llame al [phone].    We comply with applicable federal civil rights laws and [place] laws. We do not discriminate on the basis of race, color, national origin, age, disability sex, sexual orientation or gender identity.            Thank you!     Thank you for choosing [facility] EAR NOSE AND THROAT  for your care. Our goal is always to provide you with excellent care. Hearing back from our patients is one way we can continue to improve our services. Please take a few minutes to complete the written survey that you may receive in the mail after your visit with us. Thank  you!             Your Updated Medication List - Protect others around you: Learn how to safely use, store and throw away your medicines at www.disposemymeds.org.          This list is accurate as of: 9/20/17 11:08 AM.  Always use your most recent med list.                   Brand Name Dispense Instructions for use Diagnosis    doxycycline 100 MG capsule    VIBRAMYCIN    42 capsule    Take 1 capsule (100 mg) by mouth 2 times daily for 21 days    Actinomyces infection       methylPREDNISolone 4 MG tablet    MEDROL DOSEPAK    21 tablet    Take as directed per patient instruction card    Cystic fibrosis with pulmonary manifestations (H), Chronic frontal sinusitis       predniSONE 20 MG tablet    DELTASONE    15 tablet    40mg/day x 5 days then 20mg/day x 5 days    Actinomyces infection

## 2017-09-20 NOTE — PATIENT INSTRUCTIONS
Plan of care:  Follow up with DR Young on 10/11 at 0800  Clinic contact information:  1. To schedule an appointment call [phone], option 1  2. To talk to the Triage RN call [phone], option 3  3. If you need to speak to Mignon or get a message to your doctor on a Friday, call the triage RN  4. MignonRN: [phone]  5. Surgery scheduling:      Jessika Khan: [phone]      Janie Rodriguez: [phone]  6. Fax: [phone]  7. [phone]

## 2017-09-20 NOTE — LETTER
9/20/2017       RE: Manuel Gasca  [address] [address]     Dear Colleague,    Thank you for referring your patient, Manuel Gasca, to the [facility] EAR NOSE AND THROAT at [facility]. Please see a copy of my visit note below.    HISTORY OF PRESENT ILLNESS:  Manuel Gasca returns.  He has chronic rhinosinusitis related to cystic fibrosis.  He is status post recent revision surgery and has been having trouble in the postoperative period with chronic infection, inflammation and stenosis of the right frontal sinus recess.      PHYSICAL EXAMINATION:  He is examined today.  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 Otherwise normal facial features and external nose.   Endoscopy is indicated to examine his sinus cavities for infection or polyps.     PROCEDURE:  Topical anesthetic decongestant solution is applied, and nasal endoscopy is performed.  I suctioned purulent secretions from the maxillary sinus and middle meatus on the left.  I also used suction on the right, and he has some polypoid degeneration at the head of the middle turbinate and lateral nasal wall on that side.  I was able to pass a curved olive tip suction up into the frontal sinus without much difficulty today but it is disconcerting that he has again developed hypoglobus and partial obstruction of the recess on that side.  I elected to soak some Gelfoam in Kenalog solution and place it in the left frontal recess below the area of the ostium.  He tolerates this very well.  I placed this under endoscopic visualization.      ASSESSMENT AND PLAN:  He will be seeing Infectious Disease on October 5.  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  I would like to get their input on how to manage him medically.  I am going to see him back the following week to examine the sinuses.       Again, thank you for allowing me to participate in the care of  your patient.      Sincerely,    Fay Young MD

## 2017-09-20 NOTE — PROGRESS NOTES
HISTORY OF PRESENT ILLNESS:  Manuel Gasca returns.  He has chronic rhinosinusitis related to cystic fibrosis.  He is status post recent revision surgery and has been having trouble in the postoperative period with chronic infection, inflammation and stenosis of the right frontal sinus recess.      PHYSICAL EXAMINATION:  He is examined today.  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 Otherwise normal facial features and external nose.   Endoscopy is indicated to examine his sinus cavities for infection or polyps.     PROCEDURE:  Topical anesthetic decongestant solution is applied, and nasal endoscopy is performed.  I suctioned purulent secretions from the maxillary sinus and middle meatus on the left.  I also used suction on the right, and he has some polypoid degeneration at the head of the middle turbinate and lateral nasal wall on that side.  I was able to pass a curved olive tip suction up into the frontal sinus without much difficulty today but it is disconcerting that he has again developed hypoglobus and partial obstruction of the recess on that side.  I elected to soak some Gelfoam in Kenalog solution and place it in the left frontal recess below the area of the ostium.  He tolerates this very well.  I placed this under endoscopic visualization.      ASSESSMENT AND PLAN:  He will be seeing Infectious Disease on October 5.  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  I would like to get their input on how to manage him medically.  I am going to see him back the following week to examine the sinuses.

## 2017-09-20 NOTE — NURSING NOTE
Chief Complaint   Patient presents with     RECHECK     Return F/U sinus issues Pt states no pain today.        N Ba HERNANDEZN

## 2017-10-05 ENCOUNTER — OFFICE VISIT (OUTPATIENT)
Dept: INFECTIOUS DISEASES | Facility: CLINIC | Age: 22
End: 2017-10-05
Attending: INTERNAL MEDICINE
Payer: COMMERCIAL

## 2017-10-05 VITALS
HEART RATE: 77 BPM | HEIGHT: 77 IN | WEIGHT: 210 LBS | BODY MASS INDEX: 24.79 KG/M2 | DIASTOLIC BLOOD PRESSURE: 72 MMHG | TEMPERATURE: 97.8 F | SYSTOLIC BLOOD PRESSURE: 128 MMHG

## 2017-10-05 DIAGNOSIS — A42.9 ACTINOMYCES INFECTION: ICD-10-CM

## 2017-10-05 DIAGNOSIS — J32.4 CHRONIC PANSINUSITIS: Primary | ICD-10-CM

## 2017-10-05 DIAGNOSIS — E84.0 CYSTIC FIBROSIS WITH PULMONARY MANIFESTATIONS (H): ICD-10-CM

## 2017-10-05 PROCEDURE — 99212 OFFICE O/P EST SF 10 MIN: CPT | Mod: ZF

## 2017-10-05 RX ORDER — PENICILLIN V POTASSIUM 500 MG/1
500 TABLET, FILM COATED ORAL 4 TIMES DAILY
Qty: 120 TABLET | Refills: 5 | Status: SHIPPED | OUTPATIENT
Start: 2017-10-05 | End: 2019-01-31

## 2017-10-05 ASSESSMENT — PAIN SCALES - GENERAL: PAINLEVEL: NO PAIN (0)

## 2017-10-05 NOTE — MR AVS SNAPSHOT
"              After Visit Summary   10/5/2017    Manuel Gasca    MRN: [id number]           Patient Information     Date Of Birth          1995        Visit Information        Provider Department      10/5/2017 8:30 AM Zari Wilson MD [facility] and Infectious Diseases        Today's Diagnoses     Actinomyces infection           Follow-ups after your visit        Your next 10 appointments already scheduled     Oct 11, 2017  8:00 AM CDT   (Arrive by 7:45 AM)   Return Visit with Fay Young MD   [facility] Ear Nose and Throat ([facility] [place])    [address]  [address] [facility] [address] [phone]              Who to contact     If you have questions or need follow up information about today's clinic visit or your schedule please contact [facility] AND INFECTIOUS DISEASES directly at [phone].  Normal or non-critical lab and imaging results will be communicated to you by MyChart, letter or phone within 4 business days after the clinic has received the results. If you do not hear from us within 7 days, please contact the clinic through MyChart or phone. If you have a critical or abnormal lab result, we will notify you by phone as soon as possible.  Submit refill requests through Twitch or call your pharmacy and they will forward the refill request to us. Please allow 3 business days for your refill to be completed.          Additional Information About Your Visit        MyChart Information     Twitch lets you send messages to your doctor, view your test results, renew your prescriptions, schedule appointments and more. To sign up, go to [email or web address] . Click on \"Log in\" on the left side of the screen, which will take you to the Welcome page. Then click on \"Sign up Now\" on the right side of the page.     You will be asked to enter the access code listed below, as well as some personal information. Please " "follow the directions to create your username and password.     Your access code is: [email or web address]  Expires: 1/3/2018 10:18 AM     Your access code will  in 90 days. If you need help or a new code, please call your [facility] clinic or [phone].        Care EveryWhere ID     This is your Care EveryWhere ID. This could be used by other organizations to access your [facility] medical records  [id number]        Your Vitals Were     Pulse Temperature Height BMI (Body Mass Index)          77 97.8  F (36.6  C) (Oral) 1.956 m (6' 5\") 24.9 kg/m2         Blood Pressure from Last 3 Encounters:   10/05/17 128/72   17 116/65   17 124/76    Weight from Last 3 Encounters:   10/05/17 95.3 kg (210 lb)   17 94.8 kg (209 lb)   17 94.8 kg (208 lb 15.9 oz)              Today, you had the following     No orders found for display         Today's Medication Changes          These changes are accurate as of: 10/5/17 10:18 AM.  If you have any questions, ask your nurse or doctor.               Start taking these medicines.        Dose/Directions    penicillin V potassium 500 MG tablet   Commonly known as:  VEETID   Used for:  Actinomyces infection   Started by:  Zari Wilson MD        Dose:  500 mg   Take 1 tablet (500 mg) by mouth 4 times daily   Quantity:  120 tablet   Refills:  5            Where to get your medicines      These medications were sent to [place] [address]     Phone:  [phone]     penicillin V potassium 500 MG tablet                Primary Care Provider Office Phone # Fax #    Delbert Jennings [phone]       [facility] [address] [facility] [address]        Equal Access to Services     [facility] ARDEN AH: Sravan Mckay, waaxda luqadaha, qaybta kaallenora maza. So [facility] [phone].    ATENCIÓN: Si eduardola " español, tiene a toledo disposición servicios gratuitos de asistencia lingüística. Dorina walter [phone].    We comply with applicable federal civil rights laws and [place] laws. We do not discriminate on the basis of race, color, national origin, age, disability, sex, sexual orientation, or gender identity.            Thank you!     Thank you for choosing [facility] AND INFECTIOUS DISEASES  for your care. Our goal is always to provide you with excellent care. Hearing back from our patients is one way we can continue to improve our services. Please take a few minutes to complete the written survey that you may receive in the mail after your visit with us. Thank you!             Your Updated Medication List - Protect others around you: Learn how to safely use, store and throw away your medicines at www.disposemymeds.org.          This list is accurate as of: 10/5/17 10:18 AM.  Always use your most recent med list.                   Brand Name Dispense Instructions for use Diagnosis    methylPREDNISolone 4 MG tablet    MEDROL DOSEPAK    21 tablet    Take as directed per patient instruction card    Cystic fibrosis with pulmonary manifestations (H), Chronic frontal sinusitis       penicillin V potassium 500 MG tablet    VEETID    120 tablet    Take 1 tablet (500 mg) by mouth 4 times daily    Actinomyces infection       predniSONE 20 MG tablet    DELTASONE    15 tablet    40mg/day x 5 days then 20mg/day x 5 days    Actinomyces infection

## 2017-10-05 NOTE — NURSING NOTE
"Chief Complaint   Patient presents with     Consult     consult with nikunj tsai cma       Initial /72  Pulse 77  Temp 97.8  F (36.6  C) (Oral)  Ht 1.956 m (6' 5\")  Wt 95.3 kg (210 lb)  BMI 24.9 kg/m2 Estimated body mass index is 24.9 kg/(m^2) as calculated from the following:    Height as of this encounter: 1.956 m (6' 5\").    Weight as of this encounter: 95.3 kg (210 lb).  Medication Reconciliation: complete    "

## 2017-10-05 NOTE — LETTER
10/5/2017       RE: Manuel Gasca  [address] [address]     Dear Colleague,    Thank you for referring your patient, Manuel Gasca, to the [facility] AND INFECTIOUS DISEASES at [facility]. Please see a copy of my visit note below.     [facility]  Dr. Zari Wilson, [facility], Floor 3  [address] [id number]   Patient:  Manuel Gasca, Date of birth 1995, Medical record number [id number]  Date of Visit:  Oct 5, 2017         Assessment and Recommendations:     Sinus Actinomyces)  Mr. 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  However, his underlying chronic sinus issue also make it difficult to assess the degree to which his current sinus issues are secondary to actinomyces. I think that we should pursue a course of penicillin therapy with monitoring of symptoms. I suggested a minimum course of 6 months and he can follow up with me in January to see how he is doing. I did  him on the side effects and risks of penicillin therapy and counseled him to contact me with any issues.      Prescribed:  penicillin V potassium (VEETID) 500 MG tablet Take 1 tablet (500 mg) by mouth 4 times daily 120 tablet 5     Zari Wilson MD  Division of Infectious Diseases and International Medicine  [phone]         History of Infectious Disease Illness:   Mr. 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  He has already completed therapy for Staph aureus which was isolated on culture during this procedure.  He has an underlying diagnosis of cystic fibrosis and is followed closely by Dr. Fay Young, who contacted me recently to review the case.  He primary symptoms are related to chronic sinusitis  "including sinus pain, obstruction and congestion. He undergoes regular endoscopic sinus surgeries for management of this.  On his most recent surgery, actinomyces was seen on histology.  He does not note that in the last year things have been significantly worse. He denies any fevers.  He does have some headaches associated with his symptoms.  He denies any underlying GI issues.  He has not had significant pulmonary symptoms associated with his CF.  No cough or sputum production. He is currently on Prednisone for his sinus issues.  He is not on it chronically - just occasional bursts. His last sinus imaging was in 10/2016 and showed \"Stable postoperative changes of bilateral and maxillary antrotomy. There is near complete opacification of the paranasal sinuses, worsened from previous. There is unchanged bony thickening about the maxillary and frontal sinuses bilaterally. The mastoid air cells are clear\"        Past Medical and Surgical History:     Past Medical History:   Diagnosis Date     Concussion July 2011     Cystic fibrosis with pulmonary manifestations (H) 12/29/2011    Genotype: [id number]/p.R352Q (aka c.1055G>A) 7T/9T     Nasal polyps      Past Surgical History:   Procedure Laterality Date     EYE SURGERY       HC EXCISION NASAL POLYP(S), EXTENSIVE  2010    [place]     NASAL/SINUS POLYPECTOMY       OPTICAL TRACKING SYSTEM ENDOSCOPIC SINUS SURGERY  2/17/2012    Procedure:OPTICAL TRACKING SYSTEM ENDOSCOPIC SINUS SURGERY; Stealth Assised Bilateral Polypectomy, Frontal Sinusotomy, Ethmoidectomy; Surgeon:KHALIF YOUNG; Location: OR     OPTICAL TRACKING SYSTEM ENDOSCOPIC SINUS SURGERY  7/8/2013    Procedure: OPTICAL TRACKING SYSTEM ENDOSCOPIC SINUS SURGERY;  Stealth Assisted Polypectomy With Shaver, Ethmoidectomy, Frontal Sinusotomy, Maxillary Antrostomy;  Surgeon: Khalif Young MD;  Location:  OR     OPTICAL TRACKING SYSTEM ENDOSCOPIC SINUS SURGERY  11/27/2013    Procedure: OPTICAL TRACKING SYSTEM " ENDOSCOPIC SINUS SURGERY;  Stealth Assisted Frontal Sinus Mucocelel Repair ;  Surgeon: Fay Young MD;  Location: [facility] OR     OPTICAL TRACKING SYSTEM ENDOSCOPIC SINUS SURGERY  1/31/2014    Procedure: OPTICAL TRACKING SYSTEM ENDOSCOPIC SINUS SURGERY;  Bilateral Frontal Sinusotomy, Ethmoidectomy, Maxillary antrostomy, under image guidance. ;  Surgeon: Fay Young MD;  Location: [facility] OR     OPTICAL TRACKING SYSTEM ENDOSCOPIC SINUS SURGERY Bilateral 10/7/2016    Procedure: OPTICAL TRACKING SYSTEM ENDOSCOPIC SINUS SURGERY;  Surgeon: Fay Young MD;  Location: [facility] OR     OPTICAL TRACKING SYSTEM ENDOSCOPIC SINUS SURGERY Bilateral 8/11/2017    Procedure: OPTICAL TRACKING SYSTEM ENDOSCOPIC SINUS SURGERY;  Bilateral Total Ethmoidectomy, Sphenoidotomy, Maxillary Antrostomy and Frontal Sinusotomy and Propel Stent Placement on the Right, Stealth Guided;  Surgeon: Fay Young MD;  Location: [facility] OR     TEAR DUCT SURGERY  infancy           Family History:     Family History   Problem Relation Age of Onset     Hypertension Maternal Grandmother      Other - See Comments Other      Lymphoma maternal Grt grandmother     CANCER Maternal Grandfather      Prostate     CANCER Paternal Grandmother      Pancreatic CA           Social History:     Social History   Substance Use Topics     Smoking status: Never Smoker     Smokeless tobacco: Never Used     Alcohol use Yes      Comment: Social     Social History     Social History Narrative            Review of Systems:   CONSTITUTIONAL:  No fevers or chills  EYES: negative for icterus  ENT:  See HPI  RESPIRATORY:  negative for cough, sputum or dyspnea  CARDIOVASCULAR:  negative for chest pain, palpitations  GASTROINTESTINAL:  negative for nausea, vomiting, diarrhea or constipation  GENITOURINARY:  negative for dysuria  HEME:  No easy bruising  INTEGUMENT:  negative for rash or pruritus  NEURO:  Positive for headache         Current Medications:     Current Outpatient Prescriptions  "  Medication Sig Dispense Refill             predniSONE (DELTASONE) 20 MG tablet 40mg/day x 5 days then 20mg/day x 5 days 15 tablet 1     methylPREDNISolone (MEDROL DOSEPAK) 4 MG tablet Take as directed per patient instruction card 21 tablet 1            Immunization History:     Immunization History   Administered Date(s) Administered     Influenza (IIV3) 11/06/2013            Allergies:     Allergies   Allergen Reactions     Nkda [No Known Drug Allergies]             Physical Exam:   Vital signs:  /72  Pulse 77  Temp 97.8  F (36.6  C) (Oral)  Ht 1.956 m (6' 5\")  Wt 95.3 kg (210 lb)  BMI 24.9 kg/m2    Physical Examination:  GENERAL:  well-developed, well-nourished, seated in no acute distress.  HEENT:  Head is normocephalic, atraumatic   EYES:  Eyes have anicteric sclerae without conjunctival injection   ENT:  Oropharynx is moist without exudates or ulcers. Tongue is midline. No lesions noted in the anterior nares.  NECK:  Supple. No cervical lymphadenopathy  LUNGS:  Clear to auscultation bilateral.   CARDIOVASCULAR:  Regular rate and rhythm with no murmurs, gallops or rubs.  ABDOMEN:  Normal bowel sounds, soft, nontender. No appreciable hepatosplenomegaly.  SKIN:  No acute rashes.    NEUROLOGIC:  Grossly nonfocal. Active x4 extremities         Laboratory Data:     Metabolic Studies   Sodium   Date Value Ref Range Status   08/23/2013 142 133 - 144 mmol/L Final     Potassium   Date Value Ref Range Status   08/23/2013 3.9 3.4 - 5.3 mmol/L Final     Chloride   Date Value Ref Range Status   08/23/2013 105 98 - 110 mmol/L Final     Carbon Dioxide   Date Value Ref Range Status   08/23/2013 28 20 - 32 mmol/L Final     Anion Gap   Date Value Ref Range Status   08/23/2013 10 6 - 17 mmol/L Final     Urea Nitrogen   Date Value Ref Range Status   08/23/2013 13 5 - 24 mg/dL Final     Creatinine   Date Value Ref Range Status   08/23/2013 0.97 0.50 - 1.00 mg/dL Final     GFR Estimate   Date Value Ref Range Status "   08/23/2013 >90 >60 mL/min/1.7m2 Final     Glucose   Date Value Ref Range Status   08/23/2013 134 (H) 60 - 99 mg/dL Final   08/23/2013 156 (H) 60 - 99 mg/dL Final     Hemoglobin A1C   Date Value Ref Range Status   08/23/2013 5.1 4.3 - 6.0 % Final     Calcium   Date Value Ref Range Status   08/23/2013 8.9 8.7 - 10.8 mg/dL Final     Phosphorus   Date Value Ref Range Status   08/23/2013 3.5 2.5 - 4.5 mg/dL Final     Magnesium   Date Value Ref Range Status   08/23/2013 2.1 1.6 - 2.3 mg/dL Final       Inflammatory Markers No results found for: CRP    Hepatic Studies    Alkaline Phosphatase   Date Value Ref Range Status   08/23/2013 110 65 - 260 U/L Final     Albumin   Date Value Ref Range Status   08/23/2013 3.9 3.9 - 5.1 g/dL Final     AST   Date Value Ref Range Status   08/23/2013 25 0 - 35 U/L Final     GGT   Date Value Ref Range Status   08/23/2013 16 0 - 44 U/L Final       Hematology Studies      WBC   Date Value Ref Range Status   08/23/2013 4.9 4.0 - 11.0 10e9/L Final     Absolute Neutrophil   Date Value Ref Range Status   08/23/2013 2.7 1.6 - 8.3 10e9/L Final     Absolute Lymphocytes   Date Value Ref Range Status   08/23/2013 1.8 0.8 - 5.3 10e9/L Final     Absolute Monocytes   Date Value Ref Range Status   08/23/2013 0.4 0.0 - 1.3 10e9/L Final     Absolute Eosinophils   Date Value Ref Range Status   08/23/2013 0.1 0.0 - 0.7 10e9/L Final     Hemoglobin   Date Value Ref Range Status   08/23/2013 14.8 13.3 - 17.7 g/dL Final     Hematocrit   Date Value Ref Range Status   08/23/2013 42.8 40.0 - 53.0 % Final     Platelet Count   Date Value Ref Range Status   08/23/2013 220 150 - 450 10e9/L Final     Imaging:  No results found for this or any previous visit (from the past 744 hour(s)).      Zari Wilson MD

## 2017-10-11 ENCOUNTER — OFFICE VISIT (OUTPATIENT)
Dept: OTOLARYNGOLOGY | Facility: CLINIC | Age: 22
End: 2017-10-11

## 2017-10-11 VITALS — BODY MASS INDEX: 24.79 KG/M2 | WEIGHT: 210 LBS | HEIGHT: 77 IN

## 2017-10-11 DIAGNOSIS — E84.0 CYSTIC FIBROSIS WITH PULMONARY MANIFESTATIONS (H): ICD-10-CM

## 2017-10-11 DIAGNOSIS — J32.4 CHRONIC PANSINUSITIS: Primary | ICD-10-CM

## 2017-10-11 ASSESSMENT — PAIN SCALES - GENERAL: PAINLEVEL: NO PAIN (0)

## 2017-10-11 NOTE — MR AVS SNAPSHOT
After Visit Summary   10/11/2017    Manuel Gasca    MRN: [id number]           Patient Information     Date Of Birth          1995        Visit Information        Provider Department      10/11/2017 8:00 AM Fay Young MD [facility] Ear Nose and Throat        Today's Diagnoses     Chronic pansinusitis    -  1    Cystic fibrosis with pulmonary manifestations (H)          Care Instructions    Plan of care:  Follow up with Dr Young as needed  Clinic contact information:  1. To schedule an appointment call [phone], option 1  2. To talk to the Triage RN call [phone], option 3  3. If you need to speak to Mignon or get a message to your doctor on a Friday, call the triage RN  4. MignonRN: [phone]  5. Surgery scheduling:      Jessika Khan: [phone]      Janie Rodriguez: [phone]  6. Fax: [phone]  7. [phone]            Follow-ups after your visit        Who to contact     Please call your clinic at [phone] to:    Ask questions about your health    Make or cancel appointments    Discuss your medicines    Learn about your test results    Speak to your doctor   If you have compliments or concerns about an experience at your clinic, or if you wish to file a complaint, please contact [facility] Patient Relations at [phone] or email us at [email or web address]         Additional Information About Your Visit        MyChart Information     [employer] is an electronic gateway that provides easy, online access to your medical records. With [employer]o, you can request a clinic appointment, read your test results, renew a prescription or communicate with your care team.     To sign up for [email or web address] visit the website at [email or web address]   You will be asked to enter the access code listed below, as well as some personal information. Please follow the directions to create your username and password.     Your access code is:  [email or web address]  Expires: 1/3/2018 10:18 AM     Your access code will  in 90 days. If you need help or a new code, please contact your [facility] or call [phone] for assistance.        Care EveryWhere ID     This is your Care EveryWhere ID. This could be used by other organizations to access your [facility] medical records  [id number]        Your Vitals Were     Height BMI (Body Mass Index)                1.956 m (6' 5.01\") 24.9 kg/m2           Blood Pressure from Last 3 Encounters:   10/05/17 128/72   17 116/65   17 124/76    Weight from Last 3 Encounters:   10/11/17 95.3 kg (210 lb)   10/05/17 95.3 kg (210 lb)   17 94.8 kg (209 lb)              We Performed the Following     NASAL ENDOSCOPY, DIAGNOSTIC        Primary Care Provider Office Phone # Fax #    Delbert CARRASQUILLO Michaela [phone]       [facility] [address]  [facility] [address]        Equal Access to Services     ROYA HER : Hadii aad ku hadasho Soemiliano, waaxda luqadaha, qaybta kaalmada radha, lenora bella . So [facility] [phone].    ATENCIÓN: Si habla español, tiene a toledo disposición servicios gratuitos de asistencia lingüística. [facility] [phone].    We comply with applicable federal civil rights laws and [place] laws. We do not discriminate on the basis of race, color, national origin, age, disability, sex, sexual orientation, or gender identity.            Thank you!     Thank you for choosing [facility] EAR NOSE AND THROAT  for your care. Our goal is always to provide you with excellent care. Hearing back from our patients is one way we can continue to improve our services. Please take a few minutes to complete the written survey that you may receive in the mail after your visit with us. Thank you!             Your Updated Medication List - Protect others around you: Learn how to safely use, store and throw away your " medicines at www.disposemymeds.org.          This list is accurate as of: 10/11/17 11:59 PM.  Always use your most recent med list.                   Brand Name Dispense Instructions for use Diagnosis    methylPREDNISolone 4 MG tablet    MEDROL DOSEPAK    21 tablet    Take as directed per patient instruction card    Cystic fibrosis with pulmonary manifestations (H), Chronic frontal sinusitis       penicillin V potassium 500 MG tablet    VEETID    120 tablet    Take 1 tablet (500 mg) by mouth 4 times daily    Actinomyces infection       predniSONE 20 MG tablet    DELTASONE    15 tablet    40mg/day x 5 days then 20mg/day x 5 days    Actinomyces infection

## 2017-10-11 NOTE — NURSING NOTE
Chief Complaint   Patient presents with     RECHECK     Sinus recheck     Lesly Salgado Medical Assistant

## 2017-10-11 NOTE — PATIENT INSTRUCTIONS
Plan of care:  Follow up with Dr Young as needed  Clinic contact information:  1. To schedule an appointment call [phone], option 1  2. To talk to the Triage RN call [phone], option 3  3. If you need to speak to Mignon or get a message to your doctor on a Friday, call the triage RN  4. MignonRN: [phone]  5. Surgery scheduling:      Jessika Khan: [phone]      Janie Rodriguez: [phone]  6. Fax: [phone]  7. [phone]

## 2017-10-11 NOTE — LETTER
10/11/2017        RE: Manuel Gasca  [address] [address]     Dear Colleague,    Thank you for referring your patient, Manuel Gasca, to the [facility] EAR NOSE AND THROAT at [facility]. Please see a copy of my visit note below.    HISTORY OF PRESENT ILLNESS:  Manuel Gasca returns.  He is status post multiple endoscopic sinus surgeries for chronic sinusitis related to cystic fibrosis.  The last time we operated, we found that he had staph aureus as well as actinomyces.  He has seen Infectious Disease, and he is now taking   penicillin four times a day and will be doing this for quite some time.  He does feel like there is some improvement.  He is feeling a little bit of pressure over his eye but no pain.      PHYSICAL EXAMINATION:  He is examined today.  On anterior rhinoscopy, he has purulence in the right nasal passage.       PROCEDURE NOTE:  Nasal endoscopy is performed to debride the frontal recess on the right and examine the left side of the nose. Topical anesthetic decongestant solution is sprayed.  The scope is passed on the left side.  He has hyperplastic polypoid changes but minimal secretions.  I was able to pass the suction into both the frontal recess and the maxillary sinus.  On the right, he has purulent secretions.  These are suctioned out, and I was able to pass an olive tip suction up into his frontal sinus around the polypoid inflammation.  I was also able to pass suction into the right maxillary sinus where there is some crusting and debris.      ASSESSMENT AND PLAN:  I will see him back in 6-8 weeks.  He will continue penicillin.  He will contact me immediately if he is having eye swelling or pain, and we will start him on prednisone and a more aggressive course of antibiotic therapy.         Again, thank you for allowing me to participate in the care of your patient.      Sincerely,    Fay Young MD

## 2017-10-11 NOTE — PROGRESS NOTES
HISTORY OF PRESENT ILLNESS:  Manuel Gasca returns.  He is status post multiple endoscopic sinus surgeries for chronic sinusitis related to cystic fibrosis.  The last time we operated, we found that he had staph aureus as well as actinomyces.  He has seen Infectious Disease, and he is now taking   penicillin four times a day and will be doing this for quite some time.  He does feel like there is some improvement.  He is feeling a little bit of pressure over his eye but no pain.      PHYSICAL EXAMINATION:  He is examined today.  On anterior rhinoscopy, he has purulence in the right nasal passage.       PROCEDURE NOTE:  Nasal endoscopy is performed to debride the frontal recess on the right and examine the left side of the nose. Topical anesthetic decongestant solution is sprayed.  The scope is passed on the left side.  He has hyperplastic polypoid changes but minimal secretions.  I was able to pass the suction into both the frontal recess and the maxillary sinus.  On the right, he has purulent secretions.  These are suctioned out, and I was able to pass an olive tip suction up into his frontal sinus around the polypoid inflammation.  I was also able to pass suction into the right maxillary sinus where there is some crusting and debris.      ASSESSMENT AND PLAN:  I will see him back in 6-8 weeks.  He will continue penicillin.  He will contact me immediately if he is having eye swelling or pain, and we will start him on prednisone and a more aggressive course of antibiotic therapy.

## 2017-10-16 NOTE — PROGRESS NOTES
[facility]  Dr. Zari Wilson, [facility], Floor 3  [address] [place] [address]   Patient:  Manuel Gasca, Date of birth 1995, Medical record number [id number]  Date of Visit:  Oct 5, 2017         Assessment and Recommendations:     Sinus Actinomyces)  Mr. 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  However, his underlying chronic sinus issue also make it difficult to assess the degree to which his current sinus issues are secondary to actinomyces. I think that we should pursue a course of penicillin therapy with monitoring of symptoms. I suggested a minimum course of 6 months and he can follow up with me in January to see how he is doing. I did  him on the side effects and risks of penicillin therapy and counseled him to contact me with any issues.      Prescribed:  penicillin V potassium (VEETID) 500 MG tablet Take 1 tablet (500 mg) by mouth 4 times daily 120 tablet 5     Zari Wilson MD  Division of Infectious Diseases and International Medicine  [phone]         History of Infectious Disease Illness:   Mr. 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  He has already completed therapy for Staph aureus which was isolated on culture during this procedure.  He has an underlying diagnosis of cystic fibrosis and is followed closely by Dr. Fay Young, who contacted me recently to review the case.  He primary symptoms are related to chronic sinusitis including sinus pain, obstruction and congestion. He undergoes regular endoscopic sinus surgeries for management of this.  On his most recent surgery, actinomyces was seen on histology.  He does not note that in the last year things have been significantly worse. He denies any fevers.  He does have some headaches  "associated with his symptoms.  He denies any underlying GI issues.  He has not had significant pulmonary symptoms associated with his CF.  No cough or sputum production. He is currently on Prednisone for his sinus issues.  He is not on it chronically - just occasional bursts. His last sinus imaging was in 10/2016 and showed \"Stable postoperative changes of bilateral and maxillary antrotomy. There is near complete opacification of the paranasal sinuses, worsened from previous. There is unchanged bony thickening about the maxillary and frontal sinuses bilaterally. The mastoid air cells are clear\"        Past Medical and Surgical History:     Past Medical History:   Diagnosis Date     Concussion July 2011     Cystic fibrosis with pulmonary manifestations (H) 12/29/2011    Genotype: [id number] (aka c.1055G>A) 7T/9T     Nasal polyps      Past Surgical History:   Procedure Laterality Date     EYE SURGERY       HC EXCISION NASAL POLYP(S), EXTENSIVE  2010    [place]     NASAL/SINUS POLYPECTOMY       OPTICAL TRACKING SYSTEM ENDOSCOPIC SINUS SURGERY  2/17/2012    Procedure:OPTICAL TRACKING SYSTEM ENDOSCOPIC SINUS SURGERY; Stealth Assised Bilateral Polypectomy, Frontal Sinusotomy, Ethmoidectomy; Surgeon:KHALIF YOUNG; Location: OR     OPTICAL TRACKING SYSTEM ENDOSCOPIC SINUS SURGERY  7/8/2013    Procedure: OPTICAL TRACKING SYSTEM ENDOSCOPIC SINUS SURGERY;  Stealth Assisted Polypectomy With Shaver, Ethmoidectomy, Frontal Sinusotomy, Maxillary Antrostomy;  Surgeon: Khalif Young MD;  Location:  OR     OPTICAL TRACKING SYSTEM ENDOSCOPIC SINUS SURGERY  11/27/2013    Procedure: OPTICAL TRACKING SYSTEM ENDOSCOPIC SINUS SURGERY;  Stealth Assisted Frontal Sinus Mucocelel Repair ;  Surgeon: Khalif Young MD;  Location:  OR     OPTICAL TRACKING SYSTEM ENDOSCOPIC SINUS SURGERY  1/31/2014    Procedure: OPTICAL TRACKING SYSTEM ENDOSCOPIC SINUS SURGERY;  Bilateral Frontal Sinusotomy, Ethmoidectomy, Maxillary antrostomy, " under image guidance. ;  Surgeon: Fay Young MD;  Location: [facility] OR     OPTICAL TRACKING SYSTEM ENDOSCOPIC SINUS SURGERY Bilateral 10/7/2016    Procedure: OPTICAL TRACKING SYSTEM ENDOSCOPIC SINUS SURGERY;  Surgeon: Fay Young MD;  Location: [facility] OR     OPTICAL TRACKING SYSTEM ENDOSCOPIC SINUS SURGERY Bilateral 8/11/2017    Procedure: OPTICAL TRACKING SYSTEM ENDOSCOPIC SINUS SURGERY;  Bilateral Total Ethmoidectomy, Sphenoidotomy, Maxillary Antrostomy and Frontal Sinusotomy and Propel Stent Placement on the Right, Stealth Guided;  Surgeon: Fay Young MD;  Location: [facility] OR     TEAR DUCT SURGERY  infancy           Family History:     Family History   Problem Relation Age of Onset     Hypertension Maternal Grandmother      Other - See Comments Other      Lymphoma maternal Grt grandmother     CANCER Maternal Grandfather      Prostate     CANCER Paternal Grandmother      Pancreatic CA           Social History:     Social History   Substance Use Topics     Smoking status: Never Smoker     Smokeless tobacco: Never Used     Alcohol use Yes      Comment: Social     Social History     Social History Narrative            Review of Systems:   CONSTITUTIONAL:  No fevers or chills  EYES: negative for icterus  ENT:  See HPI  RESPIRATORY:  negative for cough, sputum or dyspnea  CARDIOVASCULAR:  negative for chest pain, palpitations  GASTROINTESTINAL:  negative for nausea, vomiting, diarrhea or constipation  GENITOURINARY:  negative for dysuria  HEME:  No easy bruising  INTEGUMENT:  negative for rash or pruritus  NEURO:  Positive for headache         Current Medications:     Current Outpatient Prescriptions   Medication Sig Dispense Refill             predniSONE (DELTASONE) 20 MG tablet 40mg/day x 5 days then 20mg/day x 5 days 15 tablet 1     methylPREDNISolone (MEDROL DOSEPAK) 4 MG tablet Take as directed per patient instruction card 21 tablet 1            Immunization History:     Immunization History   Administered  "Date(s) Administered     Influenza (IIV3) 11/06/2013            Allergies:     Allergies   Allergen Reactions     Nkda [No Known Drug Allergies]             Physical Exam:   Vital signs:  /72  Pulse 77  Temp 97.8  F (36.6  C) (Oral)  Ht 1.956 m (6' 5\")  Wt 95.3 kg (210 lb)  BMI 24.9 kg/m2    Physical Examination:  GENERAL:  well-developed, well-nourished, seated in no acute distress.  HEENT:  Head is normocephalic, atraumatic   EYES:  Eyes have anicteric sclerae without conjunctival injection   ENT:  Oropharynx is moist without exudates or ulcers. Tongue is midline. No lesions noted in the anterior nares.  NECK:  Supple. No cervical lymphadenopathy  LUNGS:  Clear to auscultation bilateral.   CARDIOVASCULAR:  Regular rate and rhythm with no murmurs, gallops or rubs.  ABDOMEN:  Normal bowel sounds, soft, nontender. No appreciable hepatosplenomegaly.  SKIN:  No acute rashes.    NEUROLOGIC:  Grossly nonfocal. Active x4 extremities         Laboratory Data:     Metabolic Studies   Sodium   Date Value Ref Range Status   08/23/2013 142 133 - 144 mmol/L Final     Potassium   Date Value Ref Range Status   08/23/2013 3.9 3.4 - 5.3 mmol/L Final     Chloride   Date Value Ref Range Status   08/23/2013 105 98 - 110 mmol/L Final     Carbon Dioxide   Date Value Ref Range Status   08/23/2013 28 20 - 32 mmol/L Final     Anion Gap   Date Value Ref Range Status   08/23/2013 10 6 - 17 mmol/L Final     Urea Nitrogen   Date Value Ref Range Status   08/23/2013 13 5 - 24 mg/dL Final     Creatinine   Date Value Ref Range Status   08/23/2013 0.97 0.50 - 1.00 mg/dL Final     GFR Estimate   Date Value Ref Range Status   08/23/2013 >90 >60 mL/min/1.7m2 Final     Glucose   Date Value Ref Range Status   08/23/2013 134 (H) 60 - 99 mg/dL Final   08/23/2013 156 (H) 60 - 99 mg/dL Final     Hemoglobin A1C   Date Value Ref Range Status   08/23/2013 5.1 4.3 - 6.0 % Final     Calcium   Date Value Ref Range Status   08/23/2013 8.9 8.7 - 10.8 " mg/dL Final     Phosphorus   Date Value Ref Range Status   08/23/2013 3.5 2.5 - 4.5 mg/dL Final     Magnesium   Date Value Ref Range Status   08/23/2013 2.1 1.6 - 2.3 mg/dL Final       Inflammatory Markers No results found for: CRP    Hepatic Studies    Alkaline Phosphatase   Date Value Ref Range Status   08/23/2013 110 65 - 260 U/L Final     Albumin   Date Value Ref Range Status   08/23/2013 3.9 3.9 - 5.1 g/dL Final     AST   Date Value Ref Range Status   08/23/2013 25 0 - 35 U/L Final     GGT   Date Value Ref Range Status   08/23/2013 16 0 - 44 U/L Final       Hematology Studies      WBC   Date Value Ref Range Status   08/23/2013 4.9 4.0 - 11.0 10e9/L Final     Absolute Neutrophil   Date Value Ref Range Status   08/23/2013 2.7 1.6 - 8.3 10e9/L Final     Absolute Lymphocytes   Date Value Ref Range Status   08/23/2013 1.8 0.8 - 5.3 10e9/L Final     Absolute Monocytes   Date Value Ref Range Status   08/23/2013 0.4 0.0 - 1.3 10e9/L Final     Absolute Eosinophils   Date Value Ref Range Status   08/23/2013 0.1 0.0 - 0.7 10e9/L Final     Hemoglobin   Date Value Ref Range Status   08/23/2013 14.8 13.3 - 17.7 g/dL Final     Hematocrit   Date Value Ref Range Status   08/23/2013 42.8 40.0 - 53.0 % Final     Platelet Count   Date Value Ref Range Status   08/23/2013 220 150 - 450 10e9/L Final     Imaging:  No results found for this or any previous visit (from the past 744 hour(s)).

## 2018-11-01 ENCOUNTER — TELEPHONE (OUTPATIENT)
Dept: OTOLARYNGOLOGY | Facility: CLINIC | Age: 23
End: 2018-11-01

## 2018-11-01 DIAGNOSIS — A42.9 ACTINOMYCES INFECTION: ICD-10-CM

## 2018-11-01 NOTE — TELEPHONE ENCOUNTER
[facility] Call Center    Phone Message    May a detailed message be left on voicemail: yes    Reason for Call: Other: Attn: Mignon Clark.  Pt would like to see Dr. Young sometime in November; however, Hannah is booked thru 1/6/19.  Please follow up with pt.  Thank you!     Action Taken: Other: [facility]

## 2018-11-08 NOTE — TELEPHONE ENCOUNTER
[facility] Call Center    Phone Message    May a detailed message be left on voicemail: yes    Reason for Call: Other: Patient says he has a couple Questions regarding RX for Mignon Clark     Action Taken: Message routed to:  [facility] ENT

## 2018-11-13 NOTE — TELEPHONE ENCOUNTER
Message left on patient's voicemail asking for call back with more details. Asked patient to call the RN working with Dr Young, contact information given.

## 2018-11-15 NOTE — TELEPHONE ENCOUNTER
"S-Requesting OV with Dr. Young in November or medication refill.    B-LOV 10/11/2017  FOV none scheduled at this time  Dx. Chronic sinusitis r/t cystic fibrosis    A-Patient reports feeling congested and discomfort to head/headaches.  He expressed feeling \"fine\" at this time; however, feels he has an infection and would like to be seen possibly in November or December as he doesn't want to prolong the symptoms.    R-  Next available appointment is noted to be 01/07/2019 at 630 PM.  Would you like patient to be doubled booked?   Or would you like to prescribed prednisone and Levaquin with f/u appointment in January?    Please advise, thank you         "

## 2018-11-16 NOTE — TELEPHONE ENCOUNTER
"Dr. Young,  LOV 10/11/2017, patient took penicillin and saw infectious disease.  Per medication list penicillin v potassium started 10/05/2017.  ID suggested minimum course of 6 months and f/u to see how he is doing. No follow up visit with ID noted.    Patient was requesting levaquin.  Sorry for not being clear.    Please advise, thank you!      Per Dr. Young:  \"Yes we can treat him with antibiotics and prednisone.  If levaquin was what we used in the past successfully, we could repeat that course.  Please warn patient regarding symptoms of tendonitis or muscle aches.  Should discontinue levaquin and contact us if that occurs.\"        "

## 2018-11-16 NOTE — TELEPHONE ENCOUNTER
"Per Dr. Young:  \"Still ok to use levaquin 500 daily for 14 days.  Last course was to cover a specific organism found on culture.  This time would treat him empirically with levaquin.\"    I called and left a voice message to return call.   Offer FOV January 07 at 630 pm.  "

## 2018-11-19 RX ORDER — LEVOFLOXACIN 500 MG/1
500 TABLET, FILM COATED ORAL DAILY
Qty: 14 TABLET | Refills: 0 | Status: SHIPPED | OUTPATIENT
Start: 2018-11-19 | End: 2019-01-07

## 2018-11-19 RX ORDER — PREDNISONE 20 MG/1
TABLET ORAL
Qty: 15 TABLET | Refills: 0 | Status: SHIPPED | OUTPATIENT
Start: 2018-11-19 | End: 2019-01-07

## 2018-11-19 NOTE — TELEPHONE ENCOUNTER
I informed patient with positive teach back.   Patient was in agreement with OV January 7 at 630 PM.    Lilia, I placed the time on hold, can you please officially schedule patient onto Dr. Young's schedule.  Thank you!

## 2019-01-07 ENCOUNTER — OFFICE VISIT (OUTPATIENT)
Dept: OTOLARYNGOLOGY | Facility: CLINIC | Age: 24
End: 2019-01-07
Payer: COMMERCIAL

## 2019-01-07 VITALS
BODY MASS INDEX: 23.95 KG/M2 | HEIGHT: 78 IN | WEIGHT: 207 LBS | DIASTOLIC BLOOD PRESSURE: 81 MMHG | SYSTOLIC BLOOD PRESSURE: 115 MMHG

## 2019-01-07 DIAGNOSIS — E84.0 CYSTIC FIBROSIS WITH PULMONARY MANIFESTATIONS (H): ICD-10-CM

## 2019-01-07 DIAGNOSIS — A42.9 ACTINOMYCES INFECTION: ICD-10-CM

## 2019-01-07 DIAGNOSIS — J32.4 CHRONIC PANSINUSITIS: Primary | ICD-10-CM

## 2019-01-07 RX ORDER — LEVOFLOXACIN 500 MG/1
500 TABLET, FILM COATED ORAL DAILY
Qty: 14 TABLET | Refills: 0 | Status: SHIPPED | OUTPATIENT
Start: 2019-01-07 | End: 2019-01-31

## 2019-01-07 RX ORDER — PREDNISONE 20 MG/1
TABLET ORAL
Qty: 15 TABLET | Refills: 0 | Status: SHIPPED | OUTPATIENT
Start: 2019-01-07 | End: 2019-01-31

## 2019-01-07 ASSESSMENT — MIFFLIN-ST. JEOR: SCORE: 2062.2

## 2019-01-07 ASSESSMENT — PAIN SCALES - GENERAL: PAINLEVEL: NO PAIN (0)

## 2019-01-07 NOTE — LETTER
1/7/2019       RE: Manuel Gasca  [address] [facility] [address]     Dear Colleague,    Thank you for referring your patient, Manuel Gasca, to the [facility] at [facility]. Please see a copy of my visit note below.    Patient has severe CRS with polyps related to CF.  No appreciable lung disease. Had been doing well, last surgery was 8/17.  Recently in Dec, had flare up and we treated with levaquin and steroids.  He had a slight response, but is still feeling some forehead pain, congestion and rhinorrhea. Has been irrigating with saline only.     Patient Supplied Answers to Review of Systems  No coughing or SOB.    Exam:  Very mild asymmetry of eyes, right slightly lower - stable.  Previous mucocele on right.      Procedure:  Endoscopy indicated for evaluation of extend of disease.   Topical anesthetic/decongestant spray applied.  Rigid scope used for visualization.  Findings: large polyp in anterior aspect of middle meatus on each side. Frontal recess obstructed. Some purulence.     A/P:  Will retreat with meds, repeat CT, consider surgical option if not complete response.       Again, thank you for allowing me to participate in the care of your patient.      Sincerely,    Fay Young MD

## 2019-01-08 NOTE — PROGRESS NOTES
Patient has severe CRS with polyps related to CF.  No appreciable lung disease. Had been doing well, last surgery was 8/17.  Recently in Dec, had flare up and we treated with levaquin and steroids.  He had a slight response, but is still feeling some forehead pain, congestion and rhinorrhea. Has been irrigating with saline only.     Patient Supplied Answers to Review of Systems  No coughing or SOB.    Exam:  Very mild asymmetry of eyes, right slightly lower - stable.  Previous mucocele on right.      Procedure:  Endoscopy indicated for evaluation of extend of disease.   Topical anesthetic/decongestant spray applied.  Rigid scope used for visualization.  Findings: large polyp in anterior aspect of middle meatus on each side. Frontal recess obstructed. Some purulence.     A/P:  Will retreat with meds, repeat CT, consider surgical option if not complete response.

## 2019-01-08 NOTE — NURSING NOTE
"Chief Complaint   Patient presents with     RECHECK     follow up surgical consult      Blood pressure 115/81, height 1.981 m (6' 6\"), weight 93.9 kg (207 lb).    Cliff Rubio LPN    "

## 2019-01-09 ENCOUNTER — TELEPHONE (OUTPATIENT)
Dept: OTOLARYNGOLOGY | Facility: CLINIC | Age: 24
End: 2019-01-09

## 2019-01-09 DIAGNOSIS — J32.4 CHRONIC PANSINUSITIS: Primary | ICD-10-CM

## 2019-01-09 NOTE — TELEPHONE ENCOUNTER
Jessika, could you please contact patient for surgery: image guided frontal sinusotomy bilateral, possible ethmoidectomy bilateral.    Dx. Chronic pansinusitis    Orders placed, will contact patient to complete surgery teaching.  No packet or soap given to patient, thank you!

## 2019-01-10 ENCOUNTER — TELEPHONE (OUTPATIENT)
Dept: OTOLARYNGOLOGY | Facility: CLINIC | Age: 24
End: 2019-01-10

## 2019-01-11 ENCOUNTER — DOCUMENTATION ONLY (OUTPATIENT)
Dept: OTOLARYNGOLOGY | Facility: CLINIC | Age: 24
End: 2019-01-11

## 2019-01-11 NOTE — PROGRESS NOTES
Patient is scheduled for surgery with Dr. Young    Proce:  Stealth guided sinus surgery - bilateral frontal sinusotomy and possible ethmoidectomy    Spoke with: patient    Date of Surgery: 2/15/19    Location: [facility]    H&P: Scheduled with PAC 1/28/19  5:30 pm    Additional imaging/appointments: CT 1/28/19  5:00    Surgery packet: mailed 1/11/19    Additional comments: Chepe DOW (ENT RN) notified of need for pre-op teaching        Jessika Khan   ENT Meg-Op Coordinator  [phone]

## 2019-01-11 NOTE — TELEPHONE ENCOUNTER
"I reviewed the check list and highlighted the following points provided in the  ENT Adult Default Surgery Request\"  1) Complete History and Physical within 30 days of surgery, either with PAC clinic or family clinic.   If completed outside of  Physicians, please have provider send all of your results to before the surgery.  Please schedule a history and physical with your primary care provider.  2) Discuss with primary care provider what medicines are safe before surgery.   Example, Coumadin, Plavix, Aspirin, Ibuprofen/Motrin, herbal products, Vitamin E and Fish oil may possibly be discontinued 7 days prior to surgery date.  3) Same-day surgery, must arrange for an adult to take you home and stay with you for the first 24 hours after surgery.  4) Stop drinking alcohol at least 24 hours before surgery.  5) Quit or at least cut down smoking as it higher your risks for infection after surgery. No chewing tobacco for at least 8 hours before surgery.  6) Take a bath or shower the night before and morning of surgery.   Use antiseptic soap.  Positive teach back.  7) No food or drink 8 hours before surgery. Only clear fluids 2 hours before surgery. Follow your surgeon s orders for eating and drinking.    Please following surgeon s instructions as if you don t, your surgery could be canceled.    No questions or concerns noted at this time.   "

## 2019-01-31 ENCOUNTER — ANCILLARY PROCEDURE (OUTPATIENT)
Dept: CT IMAGING | Facility: CLINIC | Age: 24
End: 2019-01-31
Payer: COMMERCIAL

## 2019-01-31 ENCOUNTER — ANESTHESIA EVENT (OUTPATIENT)
Dept: SURGERY | Facility: CLINIC | Age: 24
End: 2019-01-31

## 2019-01-31 ENCOUNTER — OFFICE VISIT (OUTPATIENT)
Dept: SURGERY | Facility: CLINIC | Age: 24
End: 2019-01-31
Payer: COMMERCIAL

## 2019-01-31 VITALS
DIASTOLIC BLOOD PRESSURE: 71 MMHG | TEMPERATURE: 98 F | BODY MASS INDEX: 23.83 KG/M2 | HEIGHT: 78 IN | OXYGEN SATURATION: 98 % | WEIGHT: 206 LBS | HEART RATE: 54 BPM | SYSTOLIC BLOOD PRESSURE: 119 MMHG

## 2019-01-31 DIAGNOSIS — Z01.818 PREOP EXAMINATION: Primary | ICD-10-CM

## 2019-01-31 DIAGNOSIS — Z01.818 PREOP EXAMINATION: ICD-10-CM

## 2019-01-31 DIAGNOSIS — A42.9 ACTINOMYCES INFECTION: ICD-10-CM

## 2019-01-31 DIAGNOSIS — J32.4 CHRONIC PANSINUSITIS: ICD-10-CM

## 2019-01-31 LAB
ANION GAP SERPL CALCULATED.3IONS-SCNC: 4 MMOL/L (ref 3–14)
BUN SERPL-MCNC: 13 MG/DL (ref 7–30)
CALCIUM SERPL-MCNC: 8.8 MG/DL (ref 8.5–10.1)
CHLORIDE SERPL-SCNC: 104 MMOL/L (ref 94–109)
CO2 SERPL-SCNC: 33 MMOL/L (ref 20–32)
CREAT SERPL-MCNC: 0.97 MG/DL (ref 0.66–1.25)
ERYTHROCYTE [DISTWIDTH] IN BLOOD BY AUTOMATED COUNT: 12.4 % (ref 10–15)
GFR SERPL CREATININE-BSD FRML MDRD: >90 ML/MIN/{1.73_M2}
GLUCOSE SERPL-MCNC: 85 MG/DL (ref 70–99)
HCT VFR BLD AUTO: 47.1 % (ref 40–53)
HGB BLD-MCNC: 15 G/DL (ref 13.3–17.7)
MCH RBC QN AUTO: 29.6 PG (ref 26.5–33)
MCHC RBC AUTO-ENTMCNC: 31.8 G/DL (ref 31.5–36.5)
MCV RBC AUTO: 93 FL (ref 78–100)
PLATELET # BLD AUTO: 204 10E9/L (ref 150–450)
POTASSIUM SERPL-SCNC: 4.1 MMOL/L (ref 3.4–5.3)
RBC # BLD AUTO: 5.07 10E12/L (ref 4.4–5.9)
SODIUM SERPL-SCNC: 141 MMOL/L (ref 133–144)
WBC # BLD AUTO: 5.9 10E9/L (ref 4–11)

## 2019-01-31 ASSESSMENT — LIFESTYLE VARIABLES: TOBACCO_USE: 0

## 2019-01-31 ASSESSMENT — MIFFLIN-ST. JEOR: SCORE: 2057.66

## 2019-01-31 NOTE — PATIENT INSTRUCTIONS
Preparing for Your Surgery      Name:  Manuel Gasca   MRN:  [id number]   :  1995   Today's Date:  2019     Arriving for surgery:  Surgery date:  2/15/19  Arrival time:  7:55 am    Please come to:     [facility]  [address] [place] [id number]     Parking is available in front of the [facility] building from 5:30AM to 8:00PM.  -  Proceed to the 5th floor to check into the Ambulatory Surgery Center.              >> There will be patient concierges on the 1st and 5th floor, for assistance or an escort, if you would like.              >> Please call [phone] with any questions day of surgery.    -  Bring your ID and insurance card.    What can I eat or drink?  -  You may have solid food or milk products until 8 hours prior to your surgery. (Until 1:25 am )  -  You may have water, apple juice or 7up/Sprite until 2 hours prior to your surgery. (Until 7:25 am )    Which medicines can I take?       -  Do not bring your own medications to the hospital.        -  Follow Otolaryngology Clinic instructions regarding Ibuprofen. If no instructions given, NO Ibuprofen the day prior to surgery.    -  Please take these medications the morning of surgery:  Acetaminophen (Tylenol) if needed    How do I prepare myself?  -  Take two showers: one the night before surgery; and one the morning of surgery.         Use Scrubcare or Hibiclens to wash from neck down.  You may use your own shampoo and conditioner. No other hair products.   -  Do NOT use lotion, powder, colognes, deodorant, or antiperspirant the day of your surgery.  -  Do NOT wear any jewelry.    Questions or Concerns:  If your surgery is at the Ambulatory Surgery Center, please call [phone]. (Mon - Fri 8 am- 3:30 pm)  Questions about surgery, contact your Surgeons office.      AFTER YOUR SURGERY  Breathing exercises   Breathing exercises help you recover faster. Take deep breaths and let  the air out slowly. This will:     Help you wake up after surgery.    Help prevent complications like pneumonia.  Preventing complications will help you go home sooner.   Nausea and vomiting   You may feel sick to your stomach after surgery; if so, let your nurse know.    Pain control:  After surgery, you may have pain. Our goal is to help you manage your pain. Pain medicine will help you feel comfortable enough to do activities that will help you heal.  These activities may include breathing exercises, walking and physical therapy.   To help your health care team treat your pain we will ask: 1) If you have pain  2) where it is located 3) describe your pain in your words  Methods of pain control include medications given by mouth, vein or by nerve block for some surgeries.  Sequential Compression Device (SCD) or Pneumo Boots:  You may need to wear SCD S on your legs or feet. These are wraps connected to a machine that pumps in air and releases it. The repeated pumping helps prevent blood clots from forming.

## 2019-01-31 NOTE — ANESTHESIA PREPROCEDURE EVALUATION
Anesthesia Pre-Procedure Evaluation    Patient: Manuel Gasca   MRN:     [id number] Gender:   male   Age:    23 year old :      1995        Preoperative Diagnosis: * No surgery found *        Past Medical History:   Diagnosis Date     Concussion 2011     Cystic fibrosis with pulmonary manifestations (H) 2011    Genotype: dF508/p.[id number] (aka c.1055G>A) 7T/9T     Nasal polyps       Past Surgical History:   Procedure Laterality Date     EYE SURGERY       HC EXCISION NASAL POLYP(S), EXTENSIVE      [place]     NASAL/SINUS POLYPECTOMY       OPTICAL TRACKING SYSTEM ENDOSCOPIC SINUS SURGERY  2012    Procedure:OPTICAL TRACKING SYSTEM ENDOSCOPIC SINUS SURGERY; Stealth Assised Bilateral Polypectomy, Frontal Sinusotomy, Ethmoidectomy; Surgeon:KHALIF MIRELES; [facility]     OPTICAL TRACKING SYSTEM ENDOSCOPIC SINUS SURGERY  2013    Procedure: OPTICAL TRACKING SYSTEM ENDOSCOPIC SINUS SURGERY;  Stealth Assisted Polypectomy With Shaver, Ethmoidectomy, Frontal Sinusotomy, Maxillary Antrostomy;  Surgeon: Khalif Mireles MD;  Location: [facility] OR     OPTICAL TRACKING SYSTEM ENDOSCOPIC SINUS SURGERY  2013    Procedure: OPTICAL TRACKING SYSTEM ENDOSCOPIC SINUS SURGERY;  Stealth Assisted Frontal Sinus Mucocelel Repair ;  Surgeon: Khalif Mireles MD;  Location: [facility] OR     OPTICAL TRACKING SYSTEM ENDOSCOPIC SINUS SURGERY  2014    Procedure: OPTICAL TRACKING SYSTEM ENDOSCOPIC SINUS SURGERY;  Bilateral Frontal Sinusotomy, Ethmoidectomy, Maxillary antrostomy, under image guidance. ;  Surgeon: Khalif Mireles MD;  Location: [facility] OR     OPTICAL TRACKING SYSTEM ENDOSCOPIC SINUS SURGERY Bilateral 10/7/2016    Procedure: OPTICAL TRACKING SYSTEM ENDOSCOPIC SINUS SURGERY;  Surgeon: Khalif Mireles MD;  [facility]     OPTICAL TRACKING SYSTEM ENDOSCOPIC SINUS SURGERY Bilateral 2017    Procedure: OPTICAL TRACKING SYSTEM ENDOSCOPIC SINUS SURGERY;  Bilateral Total Ethmoidectomy, Sphenoidotomy,  Maxillary Antrostomy and Frontal Sinusotomy and Propel Stent Placement on the Right, Stealth Guided;  Surgeon: Fay Young MD;  [facility]     TEAR DUCT SURGERY  infancy          Anesthesia Evaluation     . Pt has had prior anesthetic. Type: General    No history of anesthetic complications          ROS/MED HX    ENT/Pulmonary:     (+)other ENT- chronic pansinusitis, , cystic fibrosis . .   (-) tobacco use   Neurologic:  - neg neurologic ROS     Cardiovascular:  - neg cardiovascular ROS   (+) ----. : . . . :. . No previous cardiac testing       METS/Exercise Tolerance:  >4 METS   Hematologic:  - neg hematologic  ROS       Musculoskeletal:  - neg musculoskeletal ROS       GI/Hepatic:  - neg GI/hepatic ROS       Renal/Genitourinary:  - ROS Renal section negative       Endo:  - neg endo ROS       Psychiatric:  - neg psychiatric ROS       Infectious Disease:  - neg infectious disease ROS       Malignancy:      - no malignancy   Other:    (+) no H/O Chronic Pain,                       PHYSICAL EXAM:   Mental Status/Neuro: A/A/O   Airway: Facies: Feasible  Mallampati: I  Mouth/Opening: Full  TM distance: > 6 cm  Neck ROM: Full   Respiratory: Auscultation: CTAB     Resp. Rate: Normal     Resp. Effort: Normal      CV: Rhythm: Regular  Rate: Age appropriate  Heart: Normal Sounds   Comments:      Dental:  Dental Comments: Lower retainer                Lab Results   Component Value Date    WBC 4.9 08/23/2013    HGB 14.8 08/23/2013    HCT 42.8 08/23/2013     08/23/2013    SED 7 08/23/2013     08/23/2013    POTASSIUM 3.9 08/23/2013    CHLORIDE 105 08/23/2013    CO2 28 08/23/2013    BUN 13 08/23/2013    CR 0.97 08/23/2013     (H) 08/23/2013    MONI 8.9 08/23/2013    PHOS 3.5 08/23/2013    MAG 2.1 08/23/2013    ALBUMIN 3.9 08/23/2013    PROTTOTAL 7.1 08/23/2013    AST 25 08/23/2013    GGT 16 08/23/2013    ALKPHOS 110 08/23/2013    INR 1.07 08/23/2013    TSH 1.35 08/23/2013       Preop Vitals  BP  "Readings from Last 3 Encounters:   01/07/19 115/81   10/05/17 128/72   08/11/17 116/65    Pulse Readings from Last 3 Encounters:   10/05/17 77   08/11/17 77   07/27/17 70      Resp Readings from Last 3 Encounters:   08/11/17 16   07/27/17 14   10/07/16 16    SpO2 Readings from Last 3 Encounters:   08/11/17 97%   07/27/17 97%   10/07/16 97%      Temp Readings from Last 1 Encounters:   10/05/17 97.8  F (36.6  C) (Oral)    Ht Readings from Last 1 Encounters:   01/07/19 1.981 m (6' 6\")      Wt Readings from Last 1 Encounters:   01/07/19 93.9 kg (207 lb)    Estimated body mass index is 23.92 kg/m  as calculated from the following:    Height as of 1/7/19: 1.981 m (6' 6\").    Weight as of 1/7/19: 93.9 kg (207 lb).     LDA:  Peripheral IV 01/31/14 Right Hand (Active)   Number of days: 1826            JZG FV AN PLAN NO PONV RULE         PAC Discussion and Assessment    ASA Classification: 2  Case is suitable for: ASC  Anesthetic techniques and relevant risks discussed: GA  Invasive monitoring and risk discussed:   Types:   Possibility and Risk of blood transfusion discussed:   NPO instructions given:   Additional anesthetic preparation and risks discussed:   Needs early admission to pre-op area:   Other:     PAC Resident/NP Anesthesia Assessment:  Palomo Gasca is a 23 year old male scheduled for Image Guided Frontal Sinusotomy Bilateral, Possible Ethmoidectomy on 2/15/2019 by Dr. Young in treatment of chronic pansinusitis.  PAC referral for risk assessment and optimization for anesthesia with comorbid conditions of: cystic fibrosis    Pre-operative considerations:  1.  Cardiac:  Functional status- METS >4.  He works out regularly at home doing body weight exercises/calisthenics.  He has no cardiovascular conditions.  Intermediate risk surgery with 0.4% risk of major adverse cardiac event.   2.  Pulm:  Airway feasible.  GIOVANA risk: low.  He is diagnosed with cystic fibrosis, but denies any history of any lung " manifestations.  His only manifestation is the chronic sinusitis.   3.  GI:  Risk of PONV score = 1.  If > 2, anti-emetic intervention recommended.    VTE risk: 0.5%    Patient is optimized and is acceptable candidate for the proposed procedure.  No further diagnostic evaluation is needed.     Patient also discussed with Dr. Penaloza.    **For further details of assessment, testing, and physical exam please see H and P completed on same date.          Sherice Boo DNP, RN, APRN      Reviewed and Signed by PAC Mid-Level Provider/Resident  Mid-Level Provider/Resident: Sherice Boo DNP, RN, APRn  Date: 1/31/2019  Time: 1810    Attending Anesthesiologist Anesthesia Assessment:        Anesthesiologist:   Date:   Time:   Pass/Fail:   Disposition:     PAC Pharmacist Assessment:        Pharmacist:   Date:   Time:        FOZIA Bass CNP

## 2019-02-01 NOTE — H&P
Pre-Operative H & P     CC:  Preoperative exam to assess for increased cardiopulmonary risk while undergoing surgery and anesthesia.    Date of Encounter: 1/31/2019  Primary Care Physician:  Delbert Jennings  Manuel Gasca is a 23 year old male who presents for pre-operative H & P in preparation for Image Guided Frontal Sinusotomy Bilateral, Possible Ethmoidectomy with Dr. Young on 2/15/2019 at [facility].     Palomo Gasca is a 23 year old male with cystic fibrosis that has chronic pansinusitis.  His chronic sinusitis is related to cystic fibrosis.  He has had multiple previous sinus surgeries for management and has also been on steroids and antibiotics multiple times.  His symptoms include sinus headaches, rhinorrhea and congestions.  He has followed up with Dr. Young recently and the above listed procedure has been recommended for further management.     History is obtained from the patient and the medical record.     Past Medical History  Past Medical History:   Diagnosis Date     Chronic pansinusitis      Concussion July 2011     Cystic fibrosis (H) 12/29/2011    Genotype: dF508/p.R352Q (aka c.1055G>A) 7T/9T.  no pulmonary manifestations, only chronic sinusitis     Nasal polyps        Past Surgical History  Past Surgical History:   Procedure Laterality Date     EYE SURGERY       HC EXCISION NASAL POLYP(S), EXTENSIVE  2010    [place]     NASAL/SINUS POLYPECTOMY       OPTICAL TRACKING SYSTEM ENDOSCOPIC SINUS SURGERY  2/17/2012    Procedure:OPTICAL TRACKING SYSTEM ENDOSCOPIC SINUS SURGERY; Stealth Assised Bilateral Polypectomy, Frontal Sinusotomy, Ethmoidectomy; Surgeon:KHALIF YOUNG; Location: OR     OPTICAL TRACKING SYSTEM ENDOSCOPIC SINUS SURGERY  7/8/2013    Procedure: OPTICAL TRACKING SYSTEM ENDOSCOPIC SINUS SURGERY;  Stealth Assisted Polypectomy With Shaver, Ethmoidectomy, Frontal Sinusotomy, Maxillary Antrostomy;  Surgeon: Khalif Young MD;  Location:  OR      OPTICAL TRACKING SYSTEM ENDOSCOPIC SINUS SURGERY  11/27/2013    Procedure: OPTICAL TRACKING SYSTEM ENDOSCOPIC SINUS SURGERY;  Stealth Assisted Frontal Sinus Mucocelel Repair ;  Surgeon: Fay Young MD;  [facility]     OPTICAL TRACKING SYSTEM ENDOSCOPIC SINUS SURGERY  1/31/2014    Procedure: OPTICAL TRACKING SYSTEM ENDOSCOPIC SINUS SURGERY;  Bilateral Frontal Sinusotomy, Ethmoidectomy, Maxillary antrostomy, under image guidance. ;  Surgeon: aFy Young MD;  Location: [facility] OR     OPTICAL TRACKING SYSTEM ENDOSCOPIC SINUS SURGERY Bilateral 10/7/2016    Procedure: OPTICAL TRACKING SYSTEM ENDOSCOPIC SINUS SURGERY;  Surgeon: Fay Young MD;  [facility]     OPTICAL TRACKING SYSTEM ENDOSCOPIC SINUS SURGERY Bilateral 8/11/2017    Procedure: OPTICAL TRACKING SYSTEM ENDOSCOPIC SINUS SURGERY;  Bilateral Total Ethmoidectomy, Sphenoidotomy, Maxillary Antrostomy and Frontal Sinusotomy and Propel Stent Placement on the Right, Stealth Guided;  Surgeon: Fay Young MD;  [facility]     TEAR DUCT SURGERY  infancy       Hx of Blood transfusions/reactions: none     Hx of abnormal bleeding or anti-platelet use: none        Steroid use in the last year: 2 short courses of prednisone in the last year. Finished a 10 day course for sinusitis on 1/17/2019.    Personal or FH with difficulty with Anesthesia:  none    Prior to Admission Medications  No current outpatient medications on file.       Allergies  Allergies   Allergen Reactions     Nkda [No Known Drug Allergies]        Social History  Social History     Socioeconomic History     Marital status: Single     Spouse name: Not on file     Number of children: 0     Years of education: Not on file     Highest education level: Not on file   Social Needs     Financial resource strain: Not on file     Food insecurity - worry: Not on file     Food insecurity - inability: Not on file     Transportation needs - medical: Not on file     Transportation needs -  "non-medical: Not on file   Occupational History     Occupation: representative   Tobacco Use     Smoking status: Never Smoker     Smokeless tobacco: Never Used   Substance and Sexual Activity     Alcohol use: Yes     Comment: Social     Drug use: No     Sexual activity: Not Currently     Partners: Female   Other Topics Concern     Not on file   Social History Narrative     Not on file       Family History  Family History   Problem Relation Age of Onset     Hypertension Maternal Grandmother      Other - See Comments Other         Lymphoma maternal Grt grandmother     Cancer Maternal Grandfather         Prostate     Cancer Paternal Grandmother         Pancreatic CA     No Known Problems Mother      No Known Problems Father      No Known Problems Sister      No Known Problems Brother      No Known Problems Paternal Grandfather            ROS/MED HX  The complete review of systems is negative other than noted in the HPI or here.   ENT/Pulmonary:     (+)other ENT- chronic pansinusitis, , cystic fibrosis . .   (-) tobacco use   Neurologic:  - neg neurologic ROS     Cardiovascular:  - neg cardiovascular ROS   (+) ----. : . . . :. . No previous cardiac testing       METS/Exercise Tolerance:  >4 METS   Hematologic:  - neg hematologic  ROS       Musculoskeletal:  - neg musculoskeletal ROS       GI/Hepatic:  - neg GI/hepatic ROS       Renal/Genitourinary:  - ROS Renal section negative       Endo:  - neg endo ROS       Psychiatric:  - neg psychiatric ROS       Infectious Disease:  - neg infectious disease ROS       Malignancy:      - no malignancy   Other:    (+) no H/O Chronic Pain,                                     206 lbs 0 oz  6' 6\"   Body mass index is 23.81 kg/m .       Physical Exam  Constitutional: Awake, alert, cooperative, no apparent distress, and appears stated age.  Eyes: Pupils equal, round and reactive to light, extra ocular muscles intact, sclera clear, conjunctiva normal.  HENT: Normocephalic, oral pharynx " with moist mucus membranes, good dentition with a lower retainer.  No goiter appreciated.   Respiratory: Clear to auscultation bilaterally, no crackles or wheezing.  Cardiovascular: Regular rate and rhythm, normal S1 and S2, and no murmur noted.  Carotids +2, no bruits. No edema. Palpable pulses to radial  DP and PT arteries.   GI: Normal bowel sounds, soft, non-distended, non-tender, no masses palpated, no hepatosplenomegaly.   Lymph/Hematologic: No cervical lymphadenopathy and no supraclavicular lymphadenopathy.  Genitourinary:  deferred  Skin: Warm and dry.  No rashes at anticipated surgical site.   Musculoskeletal: Full ROM of neck. There is no redness, warmth, or swelling of the exposed joints. Gross motor strength is normal.    Neurologic: Awake, alert, oriented to name, place and time. Cranial nerves II-XII are grossly intact. Gait is normal.   Neuropsychiatric: Calm, cooperative. Normal affect.     Labs: (personally reviewed)   Component      Latest Ref Rng & Units 1/31/2019   Sodium      133 - 144 mmol/L 141   Potassium      3.4 - 5.3 mmol/L 4.1   Chloride      94 - 109 mmol/L 104   Carbon Dioxide      20 - 32 mmol/L 33 (H)   Anion Gap      3 - 14 mmol/L 4   Glucose      70 - 99 mg/dL 85   Urea Nitrogen      7 - 30 mg/dL 13   Creatinine      0.66 - 1.25 mg/dL 0.97   GFR Estimate      >60 mL/min/1.73:m2 >90   GFR Estimate If Black      >60 mL/min/1.73:m2 >90   Calcium      8.5 - 10.1 mg/dL 8.8   WBC      4.0 - 11.0 10e9/L 5.9   RBC Count      4.4 - 5.9 10e12/L 5.07   Hemoglobin      13.3 - 17.7 g/dL 15.0   Hematocrit      40.0 - 53.0 % 47.1   MCV      78 - 100 fl 93   MCH      26.5 - 33.0 pg 29.6   MCHC      31.5 - 36.5 g/dL 31.8   RDW      10.0 - 15.0 % 12.4   Platelet Count      150 - 450 10e9/L 204             ASSESSMENT and PLAN  Palomo Gasca is a 23 year old male scheduled for Image Guided Frontal Sinusotomy Bilateral, Possible Ethmoidectomy on 2/15/2019 by Dr. Young in treatment of chronic  pansinusitis.  PAC referral for risk assessment and optimization for anesthesia with comorbid conditions of: cystic fibrosis    Pre-operative considerations:  1.  Cardiac:  Functional status- METS >4.  He works out regularly at home doing body weight exercises/calisthenics.  He has no cardiovascular conditions.  Intermediate risk surgery with 0.4% risk of major adverse cardiac event.   2.  Pulm:  Airway feasible.  GIOVANA risk: low.  He is diagnosed with cystic fibrosis, but denies any history of any lung manifestations.  His only manifestation is the chronic sinusitis.   3.  GI:  Risk of PONV score = 1.  If > 2, anti-emetic intervention recommended.    VTE risk: 0.5%    Patient is optimized and is acceptable candidate for the proposed procedure.  No further diagnostic evaluation is needed.     Patient also discussed with Dr. Penaloza.              Sherice Boo DNP, RN, APRN  Preoperative Assessment Center  [facility]  Phone: [phone]  Fax: [phone]

## 2019-02-15 ENCOUNTER — ANESTHESIA EVENT (OUTPATIENT)
Dept: SURGERY | Facility: AMBULATORY SURGERY CENTER | Age: 24
End: 2019-02-15

## 2019-02-15 ENCOUNTER — HOSPITAL ENCOUNTER (OUTPATIENT)
Facility: AMBULATORY SURGERY CENTER | Age: 24
End: 2019-02-15
Attending: OTOLARYNGOLOGY
Payer: COMMERCIAL

## 2019-02-15 ENCOUNTER — ANESTHESIA (OUTPATIENT)
Dept: SURGERY | Facility: AMBULATORY SURGERY CENTER | Age: 24
End: 2019-02-15

## 2019-02-15 VITALS
DIASTOLIC BLOOD PRESSURE: 74 MMHG | HEART RATE: 81 BPM | HEIGHT: 78 IN | SYSTOLIC BLOOD PRESSURE: 130 MMHG | WEIGHT: 202 LBS | RESPIRATION RATE: 22 BRPM | BODY MASS INDEX: 23.37 KG/M2 | OXYGEN SATURATION: 99 % | TEMPERATURE: 98.2 F

## 2019-02-15 DIAGNOSIS — J32.4 CHRONIC PANSINUSITIS: Primary | ICD-10-CM

## 2019-02-15 RX ORDER — ONDANSETRON 2 MG/ML
INJECTION INTRAMUSCULAR; INTRAVENOUS PRN
Status: DISCONTINUED | OUTPATIENT
Start: 2019-02-15 | End: 2019-02-15

## 2019-02-15 RX ORDER — PROPOFOL 10 MG/ML
INJECTION, EMULSION INTRAVENOUS PRN
Status: DISCONTINUED | OUTPATIENT
Start: 2019-02-15 | End: 2019-02-15

## 2019-02-15 RX ORDER — MEPERIDINE HYDROCHLORIDE 25 MG/ML
12.5 INJECTION INTRAMUSCULAR; INTRAVENOUS; SUBCUTANEOUS
Status: DISCONTINUED | OUTPATIENT
Start: 2019-02-15 | End: 2019-02-16 | Stop reason: HOSPADM

## 2019-02-15 RX ORDER — LIDOCAINE HYDROCHLORIDE 10 MG/ML
INJECTION, SOLUTION INFILTRATION; PERINEURAL PRN
Status: DISCONTINUED | OUTPATIENT
Start: 2019-02-15 | End: 2019-02-15

## 2019-02-15 RX ORDER — FENTANYL CITRATE 50 UG/ML
25-50 INJECTION, SOLUTION INTRAMUSCULAR; INTRAVENOUS
Status: DISCONTINUED | OUTPATIENT
Start: 2019-02-15 | End: 2019-02-15 | Stop reason: HOSPADM

## 2019-02-15 RX ORDER — FENTANYL CITRATE 50 UG/ML
INJECTION, SOLUTION INTRAMUSCULAR; INTRAVENOUS PRN
Status: DISCONTINUED | OUTPATIENT
Start: 2019-02-15 | End: 2019-02-15

## 2019-02-15 RX ORDER — ONDANSETRON 4 MG/1
4 TABLET, ORALLY DISINTEGRATING ORAL EVERY 30 MIN PRN
Status: DISCONTINUED | OUTPATIENT
Start: 2019-02-15 | End: 2019-02-16 | Stop reason: HOSPADM

## 2019-02-15 RX ORDER — DEXAMETHASONE SODIUM PHOSPHATE 4 MG/ML
INJECTION, SOLUTION INTRA-ARTICULAR; INTRALESIONAL; INTRAMUSCULAR; INTRAVENOUS; SOFT TISSUE PRN
Status: DISCONTINUED | OUTPATIENT
Start: 2019-02-15 | End: 2019-02-15

## 2019-02-15 RX ORDER — HYDROMORPHONE HYDROCHLORIDE 1 MG/ML
.3-.5 INJECTION, SOLUTION INTRAMUSCULAR; INTRAVENOUS; SUBCUTANEOUS EVERY 10 MIN PRN
Status: DISCONTINUED | OUTPATIENT
Start: 2019-02-15 | End: 2019-02-16 | Stop reason: HOSPADM

## 2019-02-15 RX ORDER — ACETAMINOPHEN 325 MG/1
650 TABLET ORAL
Status: DISCONTINUED | OUTPATIENT
Start: 2019-02-15 | End: 2019-02-16 | Stop reason: HOSPADM

## 2019-02-15 RX ORDER — HYDROCODONE BITARTRATE AND ACETAMINOPHEN 5; 325 MG/1; MG/1
1-2 TABLET ORAL EVERY 4 HOURS PRN
Qty: 12 TABLET | Refills: 0 | Status: SHIPPED | OUTPATIENT
Start: 2019-02-15 | End: 2019-03-04

## 2019-02-15 RX ORDER — ECHINACEA PURPUREA EXTRACT 125 MG
2 TABLET ORAL
Qty: 480 ML | Refills: 0 | Status: SHIPPED | OUTPATIENT
Start: 2019-02-15

## 2019-02-15 RX ORDER — HYDROCODONE BITARTRATE AND ACETAMINOPHEN 5; 325 MG/1; MG/1
2 TABLET ORAL ONCE
Status: COMPLETED | OUTPATIENT
Start: 2019-02-15 | End: 2019-02-15

## 2019-02-15 RX ORDER — SODIUM CHLORIDE, SODIUM LACTATE, POTASSIUM CHLORIDE, CALCIUM CHLORIDE 600; 310; 30; 20 MG/100ML; MG/100ML; MG/100ML; MG/100ML
INJECTION, SOLUTION INTRAVENOUS CONTINUOUS
Status: DISCONTINUED | OUTPATIENT
Start: 2019-02-15 | End: 2019-02-15 | Stop reason: HOSPADM

## 2019-02-15 RX ORDER — LIDOCAINE HYDROCHLORIDE AND EPINEPHRINE 10; 10 MG/ML; UG/ML
INJECTION, SOLUTION INFILTRATION; PERINEURAL PRN
Status: DISCONTINUED | OUTPATIENT
Start: 2019-02-15 | End: 2019-02-15 | Stop reason: HOSPADM

## 2019-02-15 RX ORDER — OXYMETAZOLINE HYDROCHLORIDE 0.05 G/100ML
SPRAY NASAL PRN
Status: DISCONTINUED | OUTPATIENT
Start: 2019-02-15 | End: 2019-02-15 | Stop reason: HOSPADM

## 2019-02-15 RX ORDER — ONDANSETRON 2 MG/ML
4 INJECTION INTRAMUSCULAR; INTRAVENOUS EVERY 30 MIN PRN
Status: DISCONTINUED | OUTPATIENT
Start: 2019-02-15 | End: 2019-02-16 | Stop reason: HOSPADM

## 2019-02-15 RX ORDER — PROPOFOL 10 MG/ML
INJECTION, EMULSION INTRAVENOUS CONTINUOUS PRN
Status: DISCONTINUED | OUTPATIENT
Start: 2019-02-15 | End: 2019-02-15

## 2019-02-15 RX ORDER — LIDOCAINE 40 MG/G
CREAM TOPICAL
Status: DISCONTINUED | OUTPATIENT
Start: 2019-02-15 | End: 2019-02-15 | Stop reason: HOSPADM

## 2019-02-15 RX ORDER — OXYCODONE HYDROCHLORIDE 5 MG/1
5 TABLET ORAL EVERY 4 HOURS PRN
Status: DISCONTINUED | OUTPATIENT
Start: 2019-02-15 | End: 2019-02-16 | Stop reason: HOSPADM

## 2019-02-15 RX ORDER — NALOXONE HYDROCHLORIDE 0.4 MG/ML
.1-.4 INJECTION, SOLUTION INTRAMUSCULAR; INTRAVENOUS; SUBCUTANEOUS
Status: DISCONTINUED | OUTPATIENT
Start: 2019-02-15 | End: 2019-02-16 | Stop reason: HOSPADM

## 2019-02-15 RX ORDER — SODIUM CHLORIDE, SODIUM LACTATE, POTASSIUM CHLORIDE, CALCIUM CHLORIDE 600; 310; 30; 20 MG/100ML; MG/100ML; MG/100ML; MG/100ML
INJECTION, SOLUTION INTRAVENOUS CONTINUOUS
Status: DISCONTINUED | OUTPATIENT
Start: 2019-02-15 | End: 2019-02-16 | Stop reason: HOSPADM

## 2019-02-15 RX ADMIN — ONDANSETRON 4 MG: 2 INJECTION INTRAMUSCULAR; INTRAVENOUS at 09:24

## 2019-02-15 RX ADMIN — PROPOFOL 230 MG: 10 INJECTION, EMULSION INTRAVENOUS at 09:24

## 2019-02-15 RX ADMIN — SODIUM CHLORIDE, SODIUM LACTATE, POTASSIUM CHLORIDE, CALCIUM CHLORIDE: 600; 310; 30; 20 INJECTION, SOLUTION INTRAVENOUS at 09:13

## 2019-02-15 RX ADMIN — Medication 40 MG: at 09:24

## 2019-02-15 RX ADMIN — HYDROCODONE BITARTRATE AND ACETAMINOPHEN 2 TABLET: 5; 325 TABLET ORAL at 11:51

## 2019-02-15 RX ADMIN — Medication 10 MG: at 09:53

## 2019-02-15 RX ADMIN — PROPOFOL 100 MCG/KG/MIN: 10 INJECTION, EMULSION INTRAVENOUS at 09:24

## 2019-02-15 RX ADMIN — FENTANYL CITRATE 50 MCG: 50 INJECTION, SOLUTION INTRAMUSCULAR; INTRAVENOUS at 09:17

## 2019-02-15 RX ADMIN — Medication 10 MG: at 09:37

## 2019-02-15 RX ADMIN — DEXAMETHASONE SODIUM PHOSPHATE 10 MG: 4 INJECTION, SOLUTION INTRA-ARTICULAR; INTRALESIONAL; INTRAMUSCULAR; INTRAVENOUS; SOFT TISSUE at 09:24

## 2019-02-15 RX ADMIN — LIDOCAINE HYDROCHLORIDE 80 MG: 10 INJECTION, SOLUTION INFILTRATION; PERINEURAL at 09:24

## 2019-02-15 ASSESSMENT — MIFFLIN-ST. JEOR: SCORE: 2039.52

## 2019-02-15 NOTE — OP NOTE
Procedure Date: 02/15/2019      OTOLARYNGOLOGY OPERATIVE REPORT       PREOPERATIVE DIAGNOSIS:  Chronic rhinosinusitis, nasal polyposis, cystic fibrosis.      POSTOPERATIVE DIAGNOSIS:  Chronic rhinosinusitis, nasal polyposis, cystic fibrosis.      PROCEDURE:  Stealth image-guided functional endoscopic sinus surgery:  Bilateral nasal polypectomy, bilateral revision maxillary antrostomy, bilateral revision ethmoidectomy, bilateral revision frontal sinusotomy.        DATE OF SURGERY:  02/15/2019      STAFF SURGEON:  Fay Young MD       RESIDENT SURGEON:  Evans Maharaj MD, PGY-4       ANESTHESIA:  General.      ESTIMATED BLOOD LOSS:  20 mL      DRAINS:  None.      SPECIMENS:   1.  Left sinus contents for CF aerobic culture.   A.  Sinus contents of maxillary bilateral for surgical pathology.   B.  Sinus contents, maxillary, bilateral, bilateral sinus contents for surgical pathology.      COMPLICATIONS:  None.      IMPLANTS:  None.      INDICATIONS FOR PROCEDURE:  Manuel Gasca is a 23-year-old male with a history of cystic fibrosis, chronic rhinosinusitis and nasal polyposis (status post previous functional endoscopic sinus surgery) who was noted to have recurrent nasal polyps and obstruction of the frontal recess despite maximal medical therapy.  Given the patient's ongoing symptoms, surgical intervention was recommended.  The risks, benefits and alternatives of surgery were discussed.  The risks discussed included risk of injury to the orbit, skull base injury, CSF leak, epistaxis and lack of resolution of symptoms.  The patient elected to proceed with surgery as a management option.      FINDINGS:  Polypoid mucosa in bilateral ethmoid sinuses and frontal recesses.  Purulent drainage in both frontal sinuses and maxillary sinuses.      DESCRIPTION OF PROCEDURE:  The patient was seen in the preoperative area, where the risks and benefits of the scheduled procedure were discussed, and the patient elected to  proceed with surgery as planned.  A written informed consent was obtained.  The surgical site was marked.  The patient was brought into the operating room by the Anesthesia team, and he was identified using 3 identifiers.  The patient was then placed in a supine position on the operating table.  The Anesthesia team then began induction of general anesthesia, and the patient was intubated without difficulties.  Once the endotracheal tube was secured, the bed was rotated 90 degrees.  Afrin-soaked pledgets were placed in the patient's nasal cavities.  The patient was then prepped and draped in the usual fashion.  The Stealth image guidance was connected and noted to be functioning appropriately.      A time-out procedure was conducted to correctly identify the patient, site and procedure.  The Stealth image guidance was registered to the patient's most recent CT Stealth imaging and used throughout the procedure due to revision surgery being performed.  The Afrin-soaked pledgets were removed from the nasal cavity.  The 0 degree rigid endoscope was then used to visualize both nasal cavities.  There were polyps noted at the level of the middle turbinate in both nasal cavities.  The polyps were injected with 1% lidocaine plus 1:100,000 epinephrine.  The sinus shaver was then used to excise the polyp in the left nasal cavity.  A similar procedure was performed on the right.  One-percent lidocaine with 1:100,000 epinephrine was then injected at the root of the left middle turbinate against the lateral nasal wall and along the inferior border of the middle turbinate.  The sinus shaver was then used to remove the polypoid mucosa noted in the ethmoid cavity.  The sphenoid also was noted to be widely patent after the polypoid tissue in the ethmoid cavity was removed, and so a sphenoidotomy was not performed. There was polypoid mucosa noted to be obstructing the frontal recess, which was removed using the sinus shaver as well,  including enlargement of the frontal ostium.  There was purulent drainage noted from the frontal sinus.  The left maxillary antrum was noted to be quite narrow, and a revision antrostomy was performed using a straight Stephan-Cut and the sinus shaver.  The left nasal cavity was then packed with Afrin-soaked pledgets.     Our attention was turned over to the right side.  1% lidocaine plus 1:100,000 epinephrine was injected into the remnant of the posterior aspect of the middle turbinate and along the root of the middle turbinate. There was extensive polypoid mucosa noted in the ethmoid cavity, which was removed using the sinus shaver. Partitions were taken down using curettes and forceps in both the anterior and posterior ethmoid cavity.  With removal of the polypoid tissue in the ethmoid cavity, we noted the sphenoid os was widely patent. There was also polypoid mucosa noted in the frontal recess, which was removed using the sinus shaver, Kerrison rongeur and straight Blakesley.  After removal of the polypoid mucosa, the frontal sinus was easily suctioned with the curved navigating suction.  There was thick, purulent drainage noted from the frontal sinus.  The frontal sinus was then copiously irrigated with saline.   The right maxillary antrostomy site was widened with a cut through forceps. The right nasal cavity was then irrigated and suctioned clean of blood.      Attention was then turned over to the left side.  The left frontal sinus recess was further debrided of polypoid mucosa using the articulating rongeur and a mushroom punch, including the area of the ostium.  The ethmoid sinus cavity was treated in a similar fashion to the right.  After removal of the inflamed polypoid mucosa, we were able to easily enter the frontal sinus using a curved navigated suction.  There was purulent drainage noted from the frontal sinus, which was suctioned.  The frontal sinus was then copiously irrigated with saline.  Both nasal  cavities were then copiously irrigated with saline.  Hemostasis was noted on both sides.  The sinuses were all suctioned clean.  An orogastric tube was passed through the mouth to suction out the stomach and the pharynx.  This concluded the procedure.  All surgical counts were noted to be correct.  There were no complications.      The patient was turned over to the Anesthesia team for awakening.  The patient was taken to PACU in stable condition.      Dr. Young was present and scrubbed for the entire duration of the procedure.      Dictated by Izzy Ingram MD, PGY-4   Resident       I was present with the resident during the entire procedure and participated in the critical aspects.    KHALIF YOUNG MD       As dictated by IZZY INGRAM MD            D: 02/15/2019   T: 02/15/2019   MT: wander      Name:     STU COLUNGA   MRN:      [id number]        Account:        [id number]   :      1995           Procedure Date: 02/15/2019      Document: [id number]

## 2019-02-15 NOTE — ANESTHESIA CARE TRANSFER NOTE
Patient: Manuel Gasca    Procedure(s):  Image Guided Frontal Sinusotomy Bilateral, Ethmoidectomy    Diagnosis: Chronic Pansinusitis  Diagnosis Additional Information: No value filed.    Anesthesia Type:   No value filed.     Note:  Airway :Face Mask  Patient transferred to:PACU  Handoff Report: Identifed the Patient, Identified the Reponsible Provider, Reviewed the pertinent medical history, Discussed the surgical course, Reviewed Intra-OP anesthesia mangement and issues during anesthesia, Set expectations for post-procedure period and Allowed opportunity for questions and acknowledgement of understanding      Vitals: (Last set prior to Anesthesia Care Transfer)    CRNA VITALS  2/15/2019 1026 - 2/15/2019 1102      2/15/2019             Pulse:  81    SpO2:  100 %    Resp Rate (observed):  10                Electronically Signed By: FOZIA Pabon CRNA  February 15, 2019  11:02 AM

## 2019-02-15 NOTE — BRIEF OP NOTE
[facility]    Brief Operative Note    Pre-operative diagnosis: Chronic Pansinusitis  Post-operative diagnosis * No post-op diagnosis entered *  Procedure: Procedure(s):  Image Guided Frontal Sinusotomy Bilateral, Ethmoidectomy  Surgeon: Surgeon(s) and Role:     * Fay Young MD - Primary  Anesthesia: General   Estimated blood loss: 20ml  Drains: None  Specimens:   ID Type Source Tests Collected by Time Destination   1 : CF aerobic culture Fluid Sinus Contents, Left FLUID CULTURE AEROBIC BACTERIAL Fay Young MD 2/15/2019 10:03 AM    A : mucous - surgical Other (specify in comments) Sinus Contents, Maxillary, Bilateral SURGICAL PATHOLOGY EXAM Fay Young MD 2/15/2019  9:44 AM    B : bilateral sinus contents Tissue Sinus Contents, Maxillary, Bilateral SURGICAL PATHOLOGY EXAM Fay Young MD 2/15/2019 10:17 AM      Findings:   Polypoid nasal mucosa in bilateral ethmoid sinuses and frontal recesses. Purulent drainage in both frontal sinuses and maxillary sinuses.  Complications: None.  Implants: None.

## 2019-02-15 NOTE — PROGRESS NOTES
The ENT operative report was dictated on 2/15/19. Dictation #[id number]    Elba Maharaj MD PGY-4  Otolaryngology- Head and Neck Surgery

## 2019-02-15 NOTE — ANESTHESIA POSTPROCEDURE EVALUATION
Anesthesia POST Procedure Evaluation    Patient: Manuel Gasca   MRN:     [id number] Gender:   male   Age:    23 year old :      1995        Preoperative Diagnosis: Chronic Pansinusitis   Procedure(s):  Image Guided Frontal Sinusotomy Bilateral, Ethmoidectomy   Postop Comments: No value filed.       Anesthesia Type:  General    Reportable Event: NO     PAIN: Uncomplicated   Sign Out status: Comfortable, Well controlled pain     PONV: No PONV   Sign Out status:  No Nausea or Vomiting     Neuro/Psych: Uneventful perioperative course   Sign Out Status: Preoperative baseline; Age appropriate mentation     Airway/Resp.: Uneventful perioperative course   Sign Out Status: Non labored breathing, age appropriate RR; Resp. Status within EXPECTED Parameters     CV: Uneventful perioperative course   Sign Out status: Appropriate BP and perfusion indices; Appropriate HR/Rhythm     Disposition:   Sign Out in:  PACU  Disposition:  Phase II; Home  Recovery Course: Uneventful  Follow-Up: Not required           Last Anesthesia Record Vitals:  CRNA VITALS  2/15/2019 1026 - 2/15/2019 1126      2/15/2019             Pulse:  81    SpO2:  100 %    Resp Rate (observed):  10          Last PACU/Preop Vitals:  Vitals:    02/15/19 1115 02/15/19 1122 02/15/19 1143   BP: 132/78 135/80 130/74   Pulse: 81 87 81   Resp: 20 22 22   Temp:  36.8  C (98.2  F) 36.8  C (98.2  F)   SpO2: 100%  99%         Electronically Signed By: Ayaan Leon MD, February 15, 2019, 1:43 PM

## 2019-02-15 NOTE — DISCHARGE INSTRUCTIONS
[facility]  Home Care Following Anesthesia  For 24 hours after surgery:  1. Get plenty of rest.  A responsible adult must stay with you for at least 24 hours after you leave the surgery center.  2. Do not drive or use heavy equipment.  If you have weakness or tingling, don't drive or use heavy equipment until this feeling goes away.   3. Do not drink alcohol.   4. Avoid strenuous or risky activities.  Ask for help when climbing stairs.  5. You may feel lightheaded.  IF so, sit for a few minutes before standing.  Have someone help you get up.   6. If you have nausea (feel sick to your stomach): Drink only clear liquids such as apple juice, ginger ale, broth or 7-Up.  Rest may also help.  Be sure to drink enough fluids.  Move to a regular diet as you feel able.   7. You may have a slight fever.  Call the doctor if your fever is over 100 F (37.7 C) (taken under the tongue) or lasts longer than 24 hours.  8. You may have a dry mouth, a sore throat, muscle aches or trouble sleeping. These should go away after 24 hours.  9. Do not make important or legal decisions.   Tips for taking pain medications  To get the best pain relief possible, remember these points:    Take pain medications as directed, before pain becomes severe.    Pain medication can upset your stomach: taking it with food may help.    Constipation is a common side effect of pain medication. Drink plenty of  fluids.    Eat foods high in fiber. Take a stool softener if recommended by your doctor or pharmacist.    Do not drink alcohol, drive or operate machinery while taking pain medications.    Ask about other ways to control pain, such as with heat, ice or relaxation.    Tylenol/Acetaminophen Consumption  To help encourage the safe use of acetaminophen, the makers of TYLENOL  have lowered the maximum daily dose for single-ingredient Extra Strength TYLENOL  (acetaminophen) products sold in the [place] from 8 pills per day  (4,000 mg) to 6 pills per day (3,000 mg). The dosing interval has also changed from 2 pills every 4-6 hours to 2 pills every 6 hours.    If you feel your pain relief is insufficient, you may take Tylenol/Acetaminophen in addition to your narcotic pain medication.     Be careful not to exceed 3,000 mg of Tylenol/Acetaminophen in a 24 hour period from all sources.    If you are taking extra strength Tylenol/acetaminophen (500 mg), the maximum dose is 6 tablets in 24 hours.    If you are taking regular strength acetaminophen (325 mg), the maximum dose is 9 tablets in 24 hours.    Call a doctor for any of the followin. Signs of infection (fever, growing tenderness at the surgery site, a large amount of drainage or bleeding, severe pain, foul-smelling drainage, redness, swelling).  2. It has been over 8 to 10 hours since surgery and you are still not able to urinate (pass water).  3. Headache for over 24 hours.  Your doctor is:  Dr. Fay Young, ENT Otolaryngology: [phone]                  Or dial [phone] and ask for the resident on call for:  ENT Otolaryngology  For emergency care, call the:  [place] [facility]:  [phone] (TTY for hearing impaired: [phone])

## 2019-02-20 LAB
BACTERIA SPEC CULT: ABNORMAL
BACTERIA SPEC CULT: ABNORMAL
Lab: ABNORMAL
SPECIMEN SOURCE: ABNORMAL

## 2019-02-21 LAB — COPATH REPORT: NORMAL

## 2019-03-04 ENCOUNTER — OFFICE VISIT (OUTPATIENT)
Dept: OTOLARYNGOLOGY | Facility: CLINIC | Age: 24
End: 2019-03-04
Payer: COMMERCIAL

## 2019-03-04 VITALS
BODY MASS INDEX: 23.85 KG/M2 | DIASTOLIC BLOOD PRESSURE: 75 MMHG | HEIGHT: 77 IN | HEART RATE: 64 BPM | WEIGHT: 202 LBS | SYSTOLIC BLOOD PRESSURE: 109 MMHG

## 2019-03-04 DIAGNOSIS — J32.4 CHRONIC PANSINUSITIS: Primary | ICD-10-CM

## 2019-03-04 RX ORDER — DOXYCYCLINE HYCLATE 100 MG
100 TABLET ORAL DAILY
Qty: 42 TABLET | Refills: 0 | Status: SHIPPED | OUTPATIENT
Start: 2019-03-04

## 2019-03-04 RX ORDER — METHYLPREDNISOLONE 4 MG
TABLET, DOSE PACK ORAL
Qty: 21 TABLET | Refills: 0 | Status: SHIPPED | OUTPATIENT
Start: 2019-03-04

## 2019-03-04 ASSESSMENT — PAIN SCALES - GENERAL: PAINLEVEL: NO PAIN (0)

## 2019-03-04 ASSESSMENT — MIFFLIN-ST. JEOR: SCORE: 2023.65

## 2019-03-04 NOTE — LETTER
3/4/2019     RE: Manuel Gasca  [address]  [place] [address]     Dear Colleague,    Thank you for referring your patient, Manuel Gasca, to the [facility] EAR NOSE AND THROAT at [facility]. Please see a copy of my visit note below.    Left with some discomfort. Otherwise doing well.     Procedure:  Endoscopy indicated for post op exam and debridement  Topical anesthetic/decongestant spray applied.  Rigid scope used for visualization.  Findings: left with congestion, tight but able to enter, slightly more posterior than root of turbinate. Right easy to cannulate, no purulence    A/P: doxy and pred, see as needed.     Again, thank you for allowing me to participate in the care of your patient.      Sincerely,    Fay Young MD

## 2019-03-05 NOTE — PROGRESS NOTES
Left with some discomfort. Otherwise doing well.     Procedure:  Endoscopy indicated for post op exam and debridement  Topical anesthetic/decongestant spray applied.  Rigid scope used for visualization.  Findings: left with congestion, tight but able to enter, slightly more posterior than root of turbinate. Right easy to cannulate, no purulence    A/P: doxy and pred, see as needed.

## 2019-07-29 ENCOUNTER — OFFICE VISIT (OUTPATIENT)
Dept: OTOLARYNGOLOGY | Facility: CLINIC | Age: 24
End: 2019-07-29
Payer: COMMERCIAL

## 2019-07-29 VITALS
DIASTOLIC BLOOD PRESSURE: 80 MMHG | BODY MASS INDEX: 23.14 KG/M2 | SYSTOLIC BLOOD PRESSURE: 127 MMHG | WEIGHT: 195.1 LBS | HEART RATE: 64 BPM | RESPIRATION RATE: 16 BRPM

## 2019-07-29 DIAGNOSIS — J32.4 CHRONIC PANSINUSITIS: Primary | ICD-10-CM

## 2019-07-29 DIAGNOSIS — E84.0 CYSTIC FIBROSIS WITH PULMONARY MANIFESTATIONS (H): ICD-10-CM

## 2019-07-29 ASSESSMENT — PAIN SCALES - GENERAL: PAINLEVEL: NO PAIN (0)

## 2019-07-29 NOTE — PROGRESS NOTES
Otolaryngology Clinic      Name: Manuel Gasca  MRN: [id number]  Age: 24 year old  : 2020      Chief Complaint:   RECHECK     History of Present Illness:   Manuel Gasca is a 24 year old male with a history of CF and chronic pansinusitis who presents for follow up. I last saw the patient on 3/4/19 following endoscopic sinus surgery. Today, he reports he is feeling well, no pain, congestion or other concerns today.     2/15/2019 Optical tracking system endoscopic sinus surgery (N/A) Procedure: Image Guided Frontal Sinusotomy Bilateral, Ethmoidectomy; Surgeon: Fay Young MD; Location:  OR   2017 Optical tracking system endoscopic sinus surgery (Bilateral) Procedure: OPTICAL TRACKING SYSTEM ENDOSCOPIC SINUS SURGERY; Bilateral Total Ethmoidectomy, Sphenoidotomy, Maxillary Antrostomy and Frontal Sinusotomy and Propel Stent Placement on the Right, Stealth Guided; Surgeon: Fay Young MD; Location:  OR   10/7/2016 Optical tracking system endoscopic sinus surgery (Bilateral) Procedure: OPTICAL TRACKING SYSTEM ENDOSCOPIC SINUS SURGERY; Surgeon: Fay Young MD; Location:  OR   2014 Optical tracking system endoscopic sinus surgery Procedure: OPTICAL TRACKING SYSTEM ENDOSCOPIC SINUS SURGERY; Bilateral Frontal Sinusotomy, Ethmoidectomy, Maxillary antrostomy, under image guidance. ; Surgeon: Fay Young MD; Location:  OR   2013 Optical tracking system endoscopic sinus surgery Procedure: OPTICAL TRACKING SYSTEM ENDOSCOPIC SINUS SURGERY; Stealth Assisted Frontal Sinus Mucocelel Repair ; Surgeon: Fay Young MD; Location:  OR   2013 Optical tracking system endoscopic sinus surgery Procedure: OPTICAL TRACKING SYSTEM ENDOSCOPIC SINUS SURGERY; Stealth Assisted Polypectomy With Shaver, Ethmoidectomy, Frontal Sinusotomy, Maxillary Antrostomy; Surgeon: Fay Young MD; Location:  OR   2012 Optical tracking system endoscopic sinus surgery  Procedure:OPTICAL TRACKING SYSTEM ENDOSCOPIC SINUS SURGERY; Stealth Assised Bilateral Polypectomy, Frontal Sinusotomy, Ethmoidectomy; Surgeon:KHALIF YOUNG; Location:[facility]          Review of Systems:   Pertinent items are noted in HPI or as in patient entered ROS below, remainder of complete ROS is negative.   No flowsheet data found.      Physical Exam:   /80   Pulse 64   Resp 16   Wt 88.5 kg (195 lb 1.6 oz)   BMI 23.14 kg/m       General Assessment   The patient is alert, oriented and in no acute distress.   Head/Face/Scalp  Grossly normal.   Nose  External nose is straight, skin is normal  Septum mainly in the midline.      Procedure:  Endoscopy indicated to evaluate for pathology.   Topical anesthetic/decongestant spray applied.  Rigid scope used for visualization.  Findings: On the right, purulent mucus draining frontal recess, some minimal crusting debrided with suction, no polyps. On the left, nasal polyps and some mucus, debrided with suction.      Assessment and Plan:  There are no diagnoses linked to this encounter.     He is doing well, has some minimal crusting and drainage as well as left nasal polyp, debrided this with suction. No indication for antibiotics today.    Follow-up: Follow up in 4-5 months.       Scribe Disclosure:  I, Albina Garza, am serving as a scribe to document services personally performed by Khalif Young MD at this visit, based upon the provider's statements to me. All documentation has been reviewed by the aforementioned provider prior to being entered into the official medical record.

## 2019-07-29 NOTE — LETTER
2019       RE: Manuel Gasca  [address]  [place] [address]     Dear Colleague,    Thank you for referring your patient, Manuel Gasca, to the [facility] EAR NOSE AND THROAT at [facility]. Please see a copy of my visit note below.      Otolaryngology Clinic      Name: Manuel Gasca  MRN: [id number]  Age: 24 year old  : 2020      Chief Complaint:   RECHECK     History of Present Illness:   Manuel Gasca is a 24 year old male with a history of CF and chronic pansinusitis who presents for follow up. I last saw the patient on 3/4/19 following endoscopic sinus surgery. Today, he reports he is feeling well, no pain, congestion or other concerns today.     2/15/2019 Optical tracking system endoscopic sinus surgery (N/A) Procedure: Image Guided Frontal Sinusotomy Bilateral, Ethmoidectomy; Surgeon: Fay Young MD; Location:  OR   2017 Optical tracking system endoscopic sinus surgery (Bilateral) Procedure: OPTICAL TRACKING SYSTEM ENDOSCOPIC SINUS SURGERY; Bilateral Total Ethmoidectomy, Sphenoidotomy, Maxillary Antrostomy and Frontal Sinusotomy and Propel Stent Placement on the Right, Stealth Guided; Surgeon: Fay Young MD; Location:  OR   10/7/2016 Optical tracking system endoscopic sinus surgery (Bilateral) Procedure: OPTICAL TRACKING SYSTEM ENDOSCOPIC SINUS SURGERY; Surgeon: Fay Young MD; Location:  OR   2014 Optical tracking system endoscopic sinus surgery Procedure: OPTICAL TRACKING SYSTEM ENDOSCOPIC SINUS SURGERY; Bilateral Frontal Sinusotomy, Ethmoidectomy, Maxillary antrostomy, under image guidance. ; Surgeon: Fay Young MD; Location:  OR   2013 Optical tracking system endoscopic sinus surgery Procedure: OPTICAL TRACKING SYSTEM ENDOSCOPIC SINUS SURGERY; Stealth Assisted Frontal Sinus Mucocelel Repair ; Surgeon: Fay Young MD; Location:  OR   2013 Optical tracking  system endoscopic sinus surgery Procedure: OPTICAL TRACKING SYSTEM ENDOSCOPIC SINUS SURGERY; Stealth Assisted Polypectomy With Shaver, Ethmoidectomy, Frontal Sinusotomy, Maxillary Antrostomy; Surgeon: Khalif Mireles MD; Location:  OR   2/17/2012 Optical tracking system endoscopic sinus surgery Procedure:OPTICAL TRACKING SYSTEM ENDOSCOPIC SINUS SURGERY; Stealth Assised Bilateral Polypectomy, Frontal Sinusotomy, Ethmoidectomy; Surgeon:KHALIF MIRELES; Location: OR          Review of Systems:   Pertinent items are noted in HPI or as in patient entered ROS below, remainder of complete ROS is negative.   No flowsheet data found.      Physical Exam:   /80   Pulse 64   Resp 16   Wt 88.5 kg (195 lb 1.6 oz)   BMI 23.14 kg/m        General Assessment   The patient is alert, oriented and in no acute distress.   Head/Face/Scalp  Grossly normal.   Nose  External nose is straight, skin is normal  Septum mainly in the midline.      Procedure:  Endoscopy indicated to evaluate for pathology.   Topical anesthetic/decongestant spray applied.  Rigid scope used for visualization.  Findings: On the right, purulent mucus draining frontal recess, some minimal crusting debrided with suction, no polyps. On the left, nasal polyps and some mucus, debrided with suction.      Assessment and Plan:  There are no diagnoses linked to this encounter.     He is doing well, has some minimal crusting and drainage as well as left nasal polyp, debrided this with suction. No indication for antibiotics today.    Follow-up: Follow up in 4-5 months.       Scribe Disclosure:  I, Albina Timm, am serving as a scribe to document services personally performed by Khalif Mireles MD at this visit, based upon the provider's statements to me. All documentation has been reviewed by the aforementioned provider prior to being entered into the official medical record.      Again, thank you for allowing me to participate in the care of your patient.       Sincerely,    Fay Young MD

## 2019-07-29 NOTE — PATIENT INSTRUCTIONS
Thank You for choosing [facility]. You were seen in the ENT Clinic today.     has recommended the followin. Follow up in 4 months.      If you have any questions or concerns after your appointment, please  Call [phone].

## 2019-12-30 ENCOUNTER — OFFICE VISIT (OUTPATIENT)
Dept: OTOLARYNGOLOGY | Facility: CLINIC | Age: 24
End: 2019-12-30
Payer: COMMERCIAL

## 2019-12-30 VITALS — WEIGHT: 207 LBS | HEIGHT: 77 IN | BODY MASS INDEX: 24.44 KG/M2

## 2019-12-30 DIAGNOSIS — J32.4 CHRONIC PANSINUSITIS: Primary | ICD-10-CM

## 2019-12-30 DIAGNOSIS — E84.0 CYSTIC FIBROSIS WITH PULMONARY MANIFESTATIONS (H): ICD-10-CM

## 2019-12-30 ASSESSMENT — MIFFLIN-ST. JEOR: SCORE: 2041.33

## 2019-12-30 ASSESSMENT — PAIN SCALES - GENERAL: PAINLEVEL: NO PAIN (0)

## 2019-12-30 NOTE — LETTER
"2019     RE: Manuel Gasca  [address]  [place] [facility] [facility] [address]     Dear Colleague,    Thank you for referring your patient, Manuel Gasca, to the [facility] EAR NOSE [facility] THROAT at [facility]. Please see a copy of my visit note below.    Otolaryngology Clinic      Name: Manuel Gasca  MRN: [id number]  Age: 24 year old  : 2020      Chief Complaint:   Follow up     History of Present Illness:   Manuel Gasca is a 24 year old male with a history of CF and chronic pansinusitis who presents for follow up. I last saw the patient about 5 months ago in 2019 and he was doing well. He continues to feel well today. He has no new concerns, no pain or congestion. He has been spending time bow hunting recently too.    2/15/2019 Optical tracking system endoscopic sinus surgery (N/A) Procedure: Image Guided Frontal Sinusotomy Bilateral, Ethmoidectomy; Surgeon: Fay Young MD; Location:  OR     Review of Systems:   Pertinent items are noted in HPI or as in patient entered ROS below, remainder of complete ROS is negative.   No flowsheet data found.      Physical Exam:   Ht 1.956 m (6' 5\")   Wt 93.9 kg (207 lb)   BMI 24.55 kg/m        General Assessment   The patient is alert, oriented and in no acute distress.   Head/Face/Scalp  Grossly normal.   Nose  External nose is straight, skin is normal.      Procedure:  Endoscopy indicated to evaluate chronic sinusitis.   Topical anesthetic/decongestant spray applied.  Rigid scope used for visualization.  Findings: Crusting on the right, debrided with suction. Crusting on the left with a polyp, debrided with suction. Mucoid and purulent secretions debrided with suction as well. I obtained a sample for culture of these secretions.    Assessment and Plan:  Manuel Gasca is a 24 year old man with chronic sinusitis and CF here today for follow up. He is feeling well without increased " congestion or sinus pressure. He had some purulent drainage on exam that I sent for culture. Given this finding, I recommended a course of antibiotics which I will prescribe when the culture returns.     Follow-up: Follow up in 5-6 months for recheck or sooner with new concerns.       Scribe Disclosure:  I, Albina Garza, am serving as a scribe to document services personally performed by Fay Young MD at this visit, based upon the provider's statements to me. All documentation has been reviewed by the aforementioned provider prior to being entered into the official medical record.      The documentation recorded by the scribe accurately reflects the services I personally performed and the decisions made by me.  Fay Young MD

## 2019-12-30 NOTE — PROGRESS NOTES
"  Otolaryngology Clinic      Name: Manuel Gasca  MRN: [id number]  Age: 24 year old  : 2020      Chief Complaint:   Follow up     History of Present Illness:   Manuel Gasca is a 24 year old male with a history of CF and chronic pansinusitis who presents for follow up. I last saw the patient about 5 months ago in 2019 and he was doing well. He continues to feel well today. He has no new concerns, no pain or congestion. He has been spending time bow hunting recently too.    2/15/2019 Optical tracking system endoscopic sinus surgery (N/A) Procedure: Image Guided Frontal Sinusotomy Bilateral, Ethmoidectomy; Surgeon: Fay Young MD; Location:  OR     Review of Systems:   Pertinent items are noted in HPI or as in patient entered ROS below, remainder of complete ROS is negative.   No flowsheet data found.      Physical Exam:   Ht 1.956 m (6' 5\")   Wt 93.9 kg (207 lb)   BMI 24.55 kg/m       General Assessment   The patient is alert, oriented and in no acute distress.   Head/Face/Scalp  Grossly normal.   Nose  External nose is straight, skin is normal.      Procedure:  Endoscopy indicated to evaluate chronic sinusitis.   Topical anesthetic/decongestant spray applied.  Rigid scope used for visualization.  Findings: Crusting on the right, debrided with suction. Crusting on the left with a polyp, debrided with suction. Mucoid and purulent secretions debrided with suction as well. I obtained a sample for culture of these secretions.    Assessment and Plan:  Manuel Gasca is a 24 year old man with chronic sinusitis and CF here today for follow up. He is feeling well without increased congestion or sinus pressure. He had some purulent drainage on exam that I sent for culture. Given this finding, I recommended a course of antibiotics which I will prescribe when the culture returns.     Follow-up: Follow up in 5-6 months for recheck or sooner with new concerns.       Scribe " Disclosure:  I, Albina Garza, am serving as a scribe to document services personally performed by Fay Young MD at this visit, based upon the provider's statements to me. All documentation has been reviewed by the aforementioned provider prior to being entered into the official medical record.      The documentation recorded by the scribe accurately reflects the services I personally performed and the decisions made by me.  Fay Young MD

## 2019-12-31 NOTE — NURSING NOTE
"Chief Complaint   Patient presents with     RECHECK     Follow-up for sinusitis     Height 1.956 m (6' 5\"), weight 93.9 kg (207 lb).    Zhanna Boo, EMT    "

## 2019-12-31 NOTE — PATIENT INSTRUCTIONS
Thank You for choosing [facility]. You were seen in the ENT Clinic today.     has recommended the followin. Right sinus culture obtained, will call with results  2. Follow up with  in 6 months      If you have any questions or concerns after your appointment, please  Call [phone].

## 2020-01-04 LAB
BACTERIA SPEC CULT: ABNORMAL
SPECIMEN SOURCE: ABNORMAL

## 2020-01-06 NOTE — RESULT ENCOUNTER NOTE
Chepe I tried to do an encounter and send in a script for doxy as this is sensitive. I am not sure it went through - can you tell? Please let him know that the bacteria is sensitive to doxy.    Fay Young MD

## 2020-01-09 DIAGNOSIS — E84.0 CYSTIC FIBROSIS WITH PULMONARY MANIFESTATIONS (H): Primary | ICD-10-CM

## 2020-01-09 DIAGNOSIS — J32.4 CHRONIC PANSINUSITIS: ICD-10-CM

## 2020-01-09 RX ORDER — DOXYCYCLINE HYCLATE 100 MG
100 TABLET ORAL 2 TIMES DAILY
Qty: 42 TABLET | Refills: 0 | Status: SHIPPED | OUTPATIENT
Start: 2020-01-09 | End: 2020-01-30

## 2020-01-09 NOTE — PROGRESS NOTES
"Per Dr. Young \"I want to prescribe doxy 100 bid for 21 days. \"  In regards to CF culture [facility] 12/30/2019      "

## 2024-02-05 NOTE — NURSING NOTE
Chief Complaint   Patient presents with     RECHECK     chronic sinuitis     Lesly Salgado Medical Assistant    
Patient requests all Lab, Cardiology, and Radiology Results on their Discharge Instructions

## 2024-03-21 NOTE — ANESTHESIA PREPROCEDURE EVALUATION
"Physical Therapy    Physical Therapy Treatment    Patient Name: Alo Glass  MRN: [id number]  Today's Date: 3/21/2024  Time Calculation  Start Time: 1215  Stop Time: 1230  Time Calculation (min): 15 min       Assessment/Plan   PT Assessment  PT Assessment Results: Decreased strength, Decreased endurance, Impaired balance, Decreased mobility  End of Session Communication: Bedside nurse, PCT/NA/CTA  End of Session Patient Position: Up in bathroom (RN and PTCA outside in [place]oz and aware of patient in bathroom and requested no furhter PT today)  PT Plan  Inpatient/Swing Bed or Outpatient: Inpatient  PT Plan  Treatment/Interventions: Bed mobility, Transfer training, Gait training, Stair training, Balance training, Strengthening, Endurance training  PT Plan: Skilled PT  PT Frequency: 3 times per week  PT Discharge Recommendations: Moderate intensity level of continued care  Equipment Recommended upon Discharge: Wheeled walker  PT Recommended Transfer Status: Assist x1  PT - OK to Discharge: Yes      General Visit Information:   PT  Visit  PT Received On: 03/21/24  General  Reason for Referral: Pt is a 66 y/o male who was admitted for SOB, CHF, Sepsis  Referred By: Lou Madrigal  Past Medical History Relevant to Rehab: Benign essential hypertension  COPD  Type 2 diabetes mellitus  Gastroesophageal reflux disease  Gout  Bilateral lower extremity edema  Tobacco use disorder  Hyperlipidemia  Hypothyroidism  Obesity  Thoracic aortic aneurysm  Obstructive sleep apnea  Polycythemia  Lymphedema  Missed Visit: Yes  Missed Visit Reason: Patient refused (patient refused stating \"not today. the , mentioned walking tomorrow. No wallking today,\" no tx, RN notified.)  Prior to Session Communication: Bedside nurse  Patient Position Received: Up in chair, Alarm off, not on at start of session  Preferred Learning Style: verbal  General Comment: AXOX3, up in chair and states \"alright lets go for a walk before I take more " Anesthesia Pre-Procedure Evaluation    Patient: Manuel Gasca   MRN:     [id number] Gender:   male   Age:    23 year old :      1995        Preoperative Diagnosis: Chronic Pansinusitis   Procedure(s):  Image Guided Frontal Sinusotomy Bilateral, Possible Ethmoidectomy     Past Medical History:   Diagnosis Date     Chronic pansinusitis      Concussion 2011     Cystic fibrosis (H) 2011    Genotype: [id number] (aka c.1055G>A) 7T/9T.  no pulmonary manifestations, only chronic sinusitis     Nasal polyps       Past Surgical History:   Procedure Laterality Date     EYE SURGERY       HC EXCISION NASAL POLYP(S), EXTENSIVE      [place]     NASAL/SINUS POLYPECTOMY       OPTICAL TRACKING SYSTEM ENDOSCOPIC SINUS SURGERY  2012    Procedure:OPTICAL TRACKING SYSTEM ENDOSCOPIC SINUS SURGERY; Stealth Assised Bilateral Polypectomy, Frontal Sinusotomy, Ethmoidectomy; Surgeon:KHALIF MIRELES; [facility]     OPTICAL TRACKING SYSTEM ENDOSCOPIC SINUS SURGERY  2013    Procedure: OPTICAL TRACKING SYSTEM ENDOSCOPIC SINUS SURGERY;  Stealth Assisted Polypectomy With Shaver, Ethmoidectomy, Frontal Sinusotomy, Maxillary Antrostomy;  Surgeon: Khalif Mireles MD;  [facility]     OPTICAL TRACKING SYSTEM ENDOSCOPIC SINUS SURGERY  2013    Procedure: OPTICAL TRACKING SYSTEM ENDOSCOPIC SINUS SURGERY;  Stealth Assisted Frontal Sinus Mucocelel Repair ;  Surgeon: Khalif Mireles MD;  [facility]     OPTICAL TRACKING SYSTEM ENDOSCOPIC SINUS SURGERY  2014    Procedure: OPTICAL TRACKING SYSTEM ENDOSCOPIC SINUS SURGERY;  Bilateral Frontal Sinusotomy, Ethmoidectomy, Maxillary antrostomy, under image guidance. ;  Surgeon: Khalif Mireles MD;  [facility]     OPTICAL TRACKING SYSTEM ENDOSCOPIC SINUS SURGERY Bilateral 10/7/2016    Procedure: OPTICAL TRACKING SYSTEM ENDOSCOPIC SINUS SURGERY;  Surgeon: Khalif Mireles MD;  [facility]     OPTICAL TRACKING SYSTEM ENDOSCOPIC SINUS SURGERY  "Lasix\"    Subjective   Precautions:     Vital Signs:       Objective   Pain:  Pain Assessment  Pain Assessment: 0-10  Cognition:  Cognition  Overall Cognitive Status: Within Functional Limits  Orientation Level: Oriented X4  Postural Control:     Extremity/Trunk Assessments:    Activity Tolerance:  Activity Tolerance  Endurance: Tolerates less than 10 min exercise, no significant change in vital signs (after ambulation patient entered bathroom and dismissed Rocio HAMMER RN aware)  Treatments:                           Ambulation/Gait Training  Ambulation/Gait Training Performed: Yes  Ambulation/Gait Training 1  Surface 1: Level tile  Device 1: Rolling walker  Assistance 1: Close supervision  Comments/Distance (ft) 1: 160ft with tripod wheeld walker with hand brakes, no seat. SBA with VC for safety and technique  Transfers  Transfer: Yes  Transfer 1  Transfer From 1: Sit to, Stand to  Transfer to 1: Sit, Stand  Technique 1: Sit to stand, Stand to sit  Transfer Device 1: Walker  Transfer Level of Assistance 1: Close supervision         Outcome Measures:  [facility] Basic Mobility  Turning from your back to your side while in a flat bed without using bedrails: A little  Moving from lying on your back to sitting on the side of a flat bed without using bedrails: A little  Moving to and from bed to chair (including a wheelchair): A little  Standing up from a chair using your arms (e.g. wheelchair or bedside chair): None  To walk in hospital room: None  Climbing 3-5 steps with railing: A little  Basic Mobility - Total Score: 20    Education Documentation  Handouts, taught by Mildred Mahoney PTA at 3/21/2024  2:41 PM.  Learner: Patient  Readiness: Acceptance  Method: Explanation  Response: Verbalizes Understanding    Body Mechanics, taught by Mildred Mahoney PTA at 3/21/2024  2:41 PM.  Learner: Patient  Readiness: Acceptance  Method: Explanation  Response: Verbalizes Understanding    Precautions, taught by Mildred Mahoney PTA at " Bilateral 8/11/2017    Procedure: OPTICAL TRACKING SYSTEM ENDOSCOPIC SINUS SURGERY;  Bilateral Total Ethmoidectomy, Sphenoidotomy, Maxillary Antrostomy and Frontal Sinusotomy and Propel Stent Placement on the Right, Stealth Guided;  Surgeon: Fay Young MD;  Location[facility]     TEAR DUCT SURGERY  infancy          Anesthesia Evaluation     . Pt has had prior anesthetic.     No history of anesthetic complications          ROS/MED HX    ENT/Pulmonary:     (+), cystic fibrosis . Other pulmonary disease chronic pansinusitis.    Neurologic:  - neg neurologic ROS     Cardiovascular:  - neg cardiovascular ROS       METS/Exercise Tolerance:  >4 METS   Hematologic:  - neg hematologic  ROS       Musculoskeletal:  - neg musculoskeletal ROS       GI/Hepatic:  - neg GI/hepatic ROS       Renal/Genitourinary:  - ROS Renal section negative       Endo:  - neg endo ROS       Psychiatric:  - neg psychiatric ROS       Infectious Disease:  - neg infectious disease ROS       Malignancy:      - no malignancy   Other:    (+) no H/O Chronic Pain,                       PHYSICAL EXAM:   Mental Status/Neuro:    Airway: Facies: Feasible  Mallampati: I  Mouth/Opening: Full  TM distance: > 6 cm  Neck ROM: Full   Respiratory: Auscultation: CTAB     Resp. Rate: Normal     Resp. Effort: Normal      CV: Rhythm: Regular  Rate: Age appropriate  Heart: Normal Sounds   Comments:      Dental: Normal                  Lab Results   Component Value Date    WBC 5.9 01/31/2019    HGB 15.0 01/31/2019    HCT 47.1 01/31/2019     01/31/2019    SED 7 08/23/2013     01/31/2019    POTASSIUM 4.1 01/31/2019    CHLORIDE 104 01/31/2019    CO2 33 (H) 01/31/2019    BUN 13 01/31/2019    CR 0.97 01/31/2019    GLC 85 01/31/2019    MONI 8.8 01/31/2019    PHOS 3.5 08/23/2013    MAG 2.1 08/23/2013    ALBUMIN 3.9 08/23/2013    PROTTOTAL 7.1 08/23/2013    AST 25 08/23/2013    GGT 16 08/23/2013    ALKPHOS 110 08/23/2013    INR 1.07 08/23/2013    TSH 1.35 08/23/2013  "      Preop Vitals  BP Readings from Last 3 Encounters:   02/15/19 121/90   01/31/19 119/71   01/07/19 115/81    Pulse Readings from Last 3 Encounters:   01/31/19 54   10/05/17 77   08/11/17 77      Resp Readings from Last 3 Encounters:   02/15/19 20   08/11/17 16   07/27/17 14    SpO2 Readings from Last 3 Encounters:   02/15/19 99%   01/31/19 98%   08/11/17 97%      Temp Readings from Last 1 Encounters:   02/15/19 36.5  C (97.7  F) (Oral)    Ht Readings from Last 1 Encounters:   02/15/19 1.981 m (6' 6\")      Wt Readings from Last 1 Encounters:   02/15/19 91.6 kg (202 lb)    Estimated body mass index is 23.34 kg/m  as calculated from the following:    Height as of this encounter: 1.981 m (6' 6\").    Weight as of this encounter: 91.6 kg (202 lb).     LDA:  Peripheral IV 01/31/14 Right Hand (Active)   Number of days: 1841       Peripheral IV 02/15/19 Left Hand (Active)   Site Assessment WDL 2/15/2019  8:23 AM   Line Status Infusing 2/15/2019  8:23 AM   Phlebitis Scale 0-->no symptoms 2/15/2019  8:23 AM   Infiltration Scale 0 2/15/2019  8:23 AM   Extravasation? No 2/15/2019  8:23 AM   Dressing Intervention New dressing  2/15/2019  8:23 AM   Number of days: 0       ETT (adult) (Active)   Number of days: 0            Assessment:   ASA SCORE: 3    NPO Status: > 6 hours since completed [employer]   Documentation: H&P complete; Preop Testing complete; Consents complete   Proceeding: Proceed without further delay  Tobacco Use:  NO Active use of Tobacco/UNKNOWN Tobacco use status     Plan:   Anes. Type:  General   Pre-Induction: Midazolam IV; Acetaminophen PO   Induction:  IV (Standard)   Airway: Oral ETT   Access/Monitoring: PIV   Maintenance: Balanced   Emergence: Procedure Site   Logistics: Same Day Surgery     Postop Pain/Sedation Strategy:  Standard-Options: Opioids PRN     PONV Management:  Adult Risk Factors:, Non-Smoker, Postop Opioids  Prevention: Ondansetron; Dexamethasone     CONSENT: Direct conversation   Plan " 3/21/2024  2:41 PM.  Learner: Patient  Readiness: Acceptance  Method: Explanation  Response: Verbalizes Understanding    Home Exercise Program, taught by Mildred Mahoney PTA at 3/21/2024  2:41 PM.  Learner: Patient  Readiness: Acceptance  Method: Explanation  Response: Verbalizes Understanding    ADL Training, taught by Mildred Mahoney PTA at 3/21/2024  2:41 PM.  Learner: Patient  Readiness: Acceptance  Method: Explanation  Response: Verbalizes Understanding    Precautions, taught by Mildred Mahoney PTA at 3/21/2024  2:41 PM.  Learner: Patient  Readiness: Acceptance  Method: Explanation  Response: Verbalizes Understanding    Body Mechanics, taught by Mildred Mahoney PTA at 3/21/2024  2:41 PM.  Learner: Patient  Readiness: Acceptance  Method: Explanation  Response: Verbalizes Understanding    Mobility Training, taught by Mildred Mahoney PTA at 3/21/2024  2:41 PM.  Learner: Patient  Readiness: Acceptance  Method: Explanation  Response: Verbalizes Understanding    Education Comments  No comments found.        OP EDUCATION:       Encounter Problems       Encounter Problems (Active)       Balance       STG - Maintains dynamic standing balance without upper extremity support x 5' with dual task  (Progressing)       Start:  03/15/24    Expected End:  03/18/24               Mobility       STG - Patient will ambulate with 2WW 50' SBA  (Progressing)       Start:  03/15/24    Expected End:  03/18/24               PT Transfers       STG - Patient will perform bed mobility independently  (Progressing)       Start:  03/15/24    Expected End:  03/18/24            STG - Patient will transfer sit to and from stand mod I  (Progressing)       Start:  03/15/24    Expected End:  03/18/24               Pain - Adult          Safety       LTG - Patient will adhere to hip precautions during ADL's and transfers       Start:  03/21/24            LTG - Patient will demonstrate safety requirements appropriate to situation/environment       Start:   03/21/24            LTG - Patient will utilize safety techniques       Start:  03/21/24            STG - Patient locks brakes on wheelchair       Start:  03/21/24            STG - Patient uses call light consistently to request assistance with transfers       Start:  03/21/24            STG - Patient uses gait belt during all transfers       Start:  03/21/24                    and risks discussed with: Patient                            Ayaan Leon MD

## 2024-04-29 ENCOUNTER — OFFICE VISIT (OUTPATIENT)
Dept: OTOLARYNGOLOGY | Facility: CLINIC | Age: 29
End: 2024-04-29
Payer: COMMERCIAL

## 2024-04-29 VITALS
SYSTOLIC BLOOD PRESSURE: 118 MMHG | WEIGHT: 236 LBS | DIASTOLIC BLOOD PRESSURE: 78 MMHG | BODY MASS INDEX: 27.31 KG/M2 | HEIGHT: 78 IN | HEART RATE: 75 BPM

## 2024-04-29 DIAGNOSIS — E84.0 CYSTIC FIBROSIS WITH PULMONARY MANIFESTATIONS (H): ICD-10-CM

## 2024-04-29 DIAGNOSIS — J32.4 CHRONIC PANSINUSITIS: Primary | ICD-10-CM

## 2024-04-29 PROCEDURE — 99202 OFFICE O/P NEW SF 15 MIN: CPT | Mod: 25 | Performed by: OTOLARYNGOLOGY

## 2024-04-29 PROCEDURE — 31231 NASAL ENDOSCOPY DX: CPT | Performed by: OTOLARYNGOLOGY

## 2024-04-29 NOTE — PROGRESS NOTES
[facility] [address] [address]  Phone: [phone]  Fax: [phone]    Patient:  Manuel Gasca, Date of birth 1995  Date of Visit:  04/29/2024  Referring Provider Referred Self      Assessment & Plan      (J32.4) Chronic pansinusitis  (primary encounter diagnosis)      (E84.0) Cystic fibrosis with pulmonary manifestations (H)  Comment: Although symptomatically stable, he has evidence of disease on exam.  Plan: I am ordering a CT to evaluate for obstruction and recurrence of mucoceles. He is comfortable with this and I will contact him with results.     20 minutes spent by me on the date of the encounter doing chart review, history and exam, documentation and further activities per the note. This time is in addition to separately billable procedure.         Fay Young MD           History of Present Illness:   Manuel Gasca is a 28 year old male with a history of CF and chronic pansinusitis who presents for follow up. I last saw the patient in 2019 and he was doing well. He continues to feel well today. He has no new concerns, no pain or congestion. No new orbital or sinus symptoms. Working in HR at [employer].     2/15/2019 Optical tracking system endoscopic sinus surgery (N/A) Procedure: Image Guided Frontal Sinusotomy Bilateral, Ethmoidectomy; Surgeon: Fay Young MD; Location:  OR     PE:    General Assessment   The patient is alert, oriented and in no acute distress.   Head/Face/Scalp  Grossly normal.   Nose  External nose is straight, skin is normal.      Procedure:  Endoscopy indicated to evaluate chronic sinusitis.   Topical anesthetic/decongestant spray applied.  Rigid scope used for visualization.  Findings: Bilateral frontal recess benign appearing polyps. Some secretions on left.

## 2024-04-29 NOTE — PATIENT INSTRUCTIONS
You were seen in the ENT Clinic today by Dr. Young. If you have any questions or concerns after your appointment, please contact us (see below)       2.   The following recommendations have been made based upon your appointment today:   -Obtain CT scan at your convenience. We will follow up with you on your results once Dr. Young has reviewed the images and determines a plan of care.           How to Contact Us:  Send a [employer] message to your provider. Our team will respond to you via [employer]. Occasionally, we will need to call you to get further information.  For urgent matters (Monday-Friday), call the ENT Clinic: [phone] and speak with a call center team member - they will route your call appropriately.   If you'd like to speak directly with a nurse, please find our contact information below. We do our best to check voicemail frequently throughout the day, and will work to call you back within 1-2 days. For urgent matters, please use the general clinic phone numbers listed above.     Tyra RIZVI RN  Direct: [phone]  Mayra CHANDLER LPN  Direct: [phone]         [facility]  Department of Otolaryngology

## 2024-04-29 NOTE — LETTER
4/29/2024       RE: Manuel Gasca  [address]  [place] [address]     Dear Colleague,    Thank you for referring your patient, Manuel Gasca, to the [facility] TH[facility] [place] [facility]. Please see a copy of my visit note below.      [facility] [address]  4TH FLOOR  [facility] [address]  Phone: [phone]  Fax: [phone]    Patient:  Manuel Gasca, Date of birth 1995  Date of Visit:  04/29/2024  Referring Provider Referred Self      Assessment & Plan     (J32.4) Chronic pansinusitis  (primary encounter diagnosis)      (E84.0) Cystic fibrosis with pulmonary manifestations (H)  Comment: Although symptomatically stable, he has evidence of disease on exam.  Plan: I am ordering a CT to evaluate for obstruction and recurrence of mucoceles. He is comfortable with this and I will contact him with results.     20 minutes spent by me on the date of the encounter doing chart review, history and exam, documentation and further activities per the note. This time is in addition to separately billable procedure.         Fay Young MD           History of Present Illness:   Manuel Gasca is a 28 year old male with a history of CF and chronic pansinusitis who presents for follow up. I last saw the patient in 2019 and he was doing well. He continues to feel well today. He has no new concerns, no pain or congestion. No new orbital or sinus symptoms. Working in HR at [employer].     2/15/2019 Optical tracking system endoscopic sinus surgery (N/A) Procedure: Image Guided Frontal Sinusotomy Bilateral, Ethmoidectomy; Surgeon: Fay Young MD; Location:  OR     PE:    General Assessment   The patient is alert, oriented and in no acute distress.   Head/Face/Scalp  Grossly normal.   Nose  External nose is straight, skin is normal.       Procedure:  Endoscopy indicated to evaluate chronic sinusitis.   Topical anesthetic/decongestant spray applied.  Rigid scope used for visualization.  Findings: Bilateral frontal recess benign appearing polyps. Some secretions on left.          Again, thank you for allowing me to participate in the care of your patient.      Sincerely,    Fay Young MD

## 2024-05-06 ENCOUNTER — ANCILLARY PROCEDURE (OUTPATIENT)
Dept: CT IMAGING | Facility: CLINIC | Age: 29
End: 2024-05-06
Attending: OTOLARYNGOLOGY
Payer: COMMERCIAL

## 2024-05-06 DIAGNOSIS — J32.4 CHRONIC PANSINUSITIS: ICD-10-CM

## 2024-05-06 PROCEDURE — 70486 CT MAXILLOFACIAL W/O DYE: CPT | Performed by: RADIOLOGY

## 2024-07-06 ENCOUNTER — HEALTH MAINTENANCE LETTER (OUTPATIENT)
Age: 29
End: 2024-07-06

## 2025-07-13 ENCOUNTER — HEALTH MAINTENANCE LETTER (OUTPATIENT)
Age: 30
End: 2025-07-13

## (undated) DEVICE — TRACKER ENT OTS PATIENT FUSION 9733534

## (undated) DEVICE — SYR 50ML LL W/O NDL 309653

## (undated) DEVICE — ENDO SHEATH STORZ SHARPSITE ENDOSCRUB 0DEG 4MM 1912000

## (undated) DEVICE — ESU GROUND PAD ADULT W/CORD E7507

## (undated) DEVICE — TRACKER ENT OTS INSTRUMENT FUSION 9733533

## (undated) DEVICE — SOL WATER IRRIG 1000ML BOTTLE 2F7114

## (undated) DEVICE — DRSG HOLDER NASAL DALE 600

## (undated) DEVICE — SUCTION MANIFOLD NEPTUNE 2 SYS 1 PORT 702-025-000

## (undated) DEVICE — DECANTER VIAL 2006S

## (undated) DEVICE — BLADE SINUS TRICUT STR 4MMX13CM FUSION ROTATE W/TRACKING

## (undated) DEVICE — SPONGE COTTONOID 1/2X3" 80-1407

## (undated) DEVICE — SOL NACL 0.9% INJ 1000ML BAG 2B1324X

## (undated) DEVICE — GLOVE PROTEXIS POWDER FREE SMT 6.5  2D72PT65X

## (undated) DEVICE — LINEN TOWEL PACK X5 5464

## (undated) DEVICE — SOL NACL 0.9% IRRIG 1000ML BOTTLE 2F7124

## (undated) DEVICE — DRAPE WARMER 66X44" ORS-300

## (undated) DEVICE — BUR TAPER DIAMOND 4MM 70DEG 1883672HS

## (undated) DEVICE — NDL 25GA 2"  8881200441

## (undated) DEVICE — TUBING IRRIGATOR STRAIGHTSHOT XPS 1895522

## (undated) DEVICE — TUBING SUCTION 12"X1/4" N612

## (undated) DEVICE — SYR 30ML LL W/O NDL 302832

## (undated) DEVICE — SYR 03ML LL W/O NDL 309657

## (undated) DEVICE — DRAPE U SPLIT 74X120" 29440

## (undated) DEVICE — SPECIMEN TRAP STRYKER NEPTUNE IN-LINE 0700-050-000

## (undated) DEVICE — BLADE SINUS RAD40 CVD 4MM FUSION ROTATE 18840006EM

## (undated) DEVICE — PACK ENT ENDOSCOPY CUSTOM ASC

## (undated) DEVICE — Device

## (undated) DEVICE — NDL COUNTER 20CT 31142493

## (undated) DEVICE — SPONGE RAY-TEC 4X8" 7318

## (undated) DEVICE — WIPE INSTRUMENT MEROCEL 400200

## (undated) DEVICE — BASIN SET SINGLE STERILE 13752-624

## (undated) RX ORDER — LIDOCAINE HYDROCHLORIDE AND EPINEPHRINE 10; 10 MG/ML; UG/ML
INJECTION, SOLUTION INFILTRATION; PERINEURAL
Status: DISPENSED
Start: 2019-02-15

## (undated) RX ORDER — FENTANYL CITRATE 50 UG/ML
INJECTION, SOLUTION INTRAMUSCULAR; INTRAVENOUS
Status: DISPENSED
Start: 2017-08-11

## (undated) RX ORDER — DEXAMETHASONE SODIUM PHOSPHATE 4 MG/ML
INJECTION, SOLUTION INTRA-ARTICULAR; INTRALESIONAL; INTRAMUSCULAR; INTRAVENOUS; SOFT TISSUE
Status: DISPENSED
Start: 2017-08-11

## (undated) RX ORDER — PROPOFOL 10 MG/ML
INJECTION, EMULSION INTRAVENOUS
Status: DISPENSED
Start: 2017-08-11

## (undated) RX ORDER — ONDANSETRON 2 MG/ML
INJECTION INTRAMUSCULAR; INTRAVENOUS
Status: DISPENSED
Start: 2019-02-15

## (undated) RX ORDER — KETOROLAC TROMETHAMINE 30 MG/ML
INJECTION, SOLUTION INTRAMUSCULAR; INTRAVENOUS
Status: DISPENSED
Start: 2019-02-15

## (undated) RX ORDER — HYDROCODONE BITARTRATE AND ACETAMINOPHEN 5; 325 MG/1; MG/1
TABLET ORAL
Status: DISPENSED
Start: 2019-02-15

## (undated) RX ORDER — KETOROLAC TROMETHAMINE 30 MG/ML
INJECTION, SOLUTION INTRAMUSCULAR; INTRAVENOUS
Status: DISPENSED
Start: 2017-08-11

## (undated) RX ORDER — TRIAMCINOLONE ACETONIDE 40 MG/ML
INJECTION, SUSPENSION INTRA-ARTICULAR; INTRAMUSCULAR
Status: DISPENSED
Start: 2017-09-20

## (undated) RX ORDER — GABAPENTIN 300 MG/1
CAPSULE ORAL
Status: DISPENSED
Start: 2017-08-11

## (undated) RX ORDER — PHENYLEPHRINE HCL IN 0.9% NACL 1 MG/10 ML
SYRINGE (ML) INTRAVENOUS
Status: DISPENSED
Start: 2019-02-15

## (undated) RX ORDER — FENTANYL CITRATE 50 UG/ML
INJECTION, SOLUTION INTRAMUSCULAR; INTRAVENOUS
Status: DISPENSED
Start: 2019-02-15

## (undated) RX ORDER — ACETAMINOPHEN 325 MG/1
TABLET ORAL
Status: DISPENSED
Start: 2017-08-11

## (undated) RX ORDER — OXYMETAZOLINE HYDROCHLORIDE 0.05 G/100ML
SPRAY NASAL
Status: DISPENSED
Start: 2017-08-11

## (undated) RX ORDER — PROPOFOL 10 MG/ML
INJECTION, EMULSION INTRAVENOUS
Status: DISPENSED
Start: 2019-02-15

## (undated) RX ORDER — ONDANSETRON 2 MG/ML
INJECTION INTRAMUSCULAR; INTRAVENOUS
Status: DISPENSED
Start: 2017-08-11